# Patient Record
Sex: MALE | Race: WHITE | NOT HISPANIC OR LATINO | Employment: OTHER | URBAN - METROPOLITAN AREA
[De-identification: names, ages, dates, MRNs, and addresses within clinical notes are randomized per-mention and may not be internally consistent; named-entity substitution may affect disease eponyms.]

---

## 2019-10-28 ENCOUNTER — OFFICE VISIT - HEALTHEAST (OUTPATIENT)
Dept: FAMILY MEDICINE | Facility: CLINIC | Age: 69
End: 2019-10-28

## 2019-10-28 ENCOUNTER — COMMUNICATION - HEALTHEAST (OUTPATIENT)
Dept: FAMILY MEDICINE | Facility: CLINIC | Age: 69
End: 2019-10-28

## 2019-10-28 DIAGNOSIS — R39.11 URINARY HESITANCY: ICD-10-CM

## 2019-10-28 DIAGNOSIS — Z23 NEED FOR VACCINATION: ICD-10-CM

## 2019-10-28 DIAGNOSIS — Z11.59 NEED FOR HEPATITIS C SCREENING TEST: ICD-10-CM

## 2019-10-28 DIAGNOSIS — Z13.220 LIPID SCREENING: ICD-10-CM

## 2019-10-28 DIAGNOSIS — Z00.00 MEDICARE ANNUAL WELLNESS VISIT, SUBSEQUENT: ICD-10-CM

## 2019-10-28 DIAGNOSIS — Z13.1 SCREENING FOR DIABETES MELLITUS: ICD-10-CM

## 2019-10-28 DIAGNOSIS — H57.04: ICD-10-CM

## 2019-10-28 DIAGNOSIS — Z12.11 SCREEN FOR COLON CANCER: ICD-10-CM

## 2019-10-28 LAB
ANION GAP SERPL CALCULATED.3IONS-SCNC: 7 MMOL/L (ref 5–18)
BUN SERPL-MCNC: 15 MG/DL (ref 8–22)
CALCIUM SERPL-MCNC: 9.3 MG/DL (ref 8.5–10.5)
CHLORIDE BLD-SCNC: 104 MMOL/L (ref 98–107)
CHOLEST SERPL-MCNC: 219 MG/DL
CO2 SERPL-SCNC: 27 MMOL/L (ref 22–31)
CREAT SERPL-MCNC: 0.99 MG/DL (ref 0.7–1.3)
FASTING STATUS PATIENT QL REPORTED: YES
GFR SERPL CREATININE-BSD FRML MDRD: >60 ML/MIN/1.73M2
GLUCOSE BLD-MCNC: 96 MG/DL (ref 70–125)
HCV AB SERPL QL IA: NEGATIVE
HDLC SERPL-MCNC: 45 MG/DL
LDLC SERPL CALC-MCNC: 143 MG/DL
POTASSIUM BLD-SCNC: 3.8 MMOL/L (ref 3.5–5)
SODIUM SERPL-SCNC: 138 MMOL/L (ref 136–145)
TRIGL SERPL-MCNC: 153 MG/DL

## 2019-10-28 ASSESSMENT — MIFFLIN-ST. JEOR: SCORE: 1670.6

## 2021-06-02 NOTE — TELEPHONE ENCOUNTER
----- Message from Hudson Shields MD sent at 10/28/2019  3:15 PM CDT -----  No sign of hepatitis C infection, normal kidney function and blood sugar, lipids are similar to 4 years ago. But with your  Current age, blood pressure and these lipids, your 10-year risk of cardiovascular event such as heart attack or stroke is about 17%.  So current recommendation would be to have you start a statin medicine to lower your cholesterol and lower this risk.  An alternative would be to follow a low-fat low-cholesterol diet very carefully and recheck in 2 to 3 months although it will be hard for you to lower this enough to avoid the recommendation to consider medication.

## 2021-06-02 NOTE — TELEPHONE ENCOUNTER
Patient Returning Call  Reason for call:  Patient is returning call  Information relayed to patient:  Message below from Dr. Hudson Shields regarding lab results and risks, along with recommendations to begin a statin medication, or as an alternative may try to follow a strict low-fat low-cholesterol diet, with recheck in 2-3 months relayed to the patient.   Patient has additional questions:  No  If YES, what are your questions/concerns:  Patient stated he would like to take some time to think about his options and will call back with his decision.   Okay to leave a detailed message?: No

## 2021-06-02 NOTE — PROGRESS NOTES
Assessment and Plan:   Annual wellness visit.  1. Medicare annual wellness visit, subsequent     2. Need for vaccination  Influenza High Dose,Seasonal,PF 65+ Yrs    Pneumococcal conjugate vaccine 13-valent 6wks-17yrs; >50yrs   3. Screen for colon cancer  Ambulatory referral for Colonoscopy   4. Lipid screening  Lipid [place]   5. Screening for diabetes mellitus  Basic Metabolic Panel   6. Need for hepatitis C screening test  Hepatitis C Antibody (Anti-HCV)   7. Urinary hesitancy  Basic Metabolic Panel     Recommend he exercise daily for 1 hour moderately strenuous per national guidelines, recommend he watch portion sizes to try to slightly reduce weight.  Follow-up in 1 year for next annual wellness visit and for Pneumovax vaccination.  Call return earlier as needed.  He agrees with not doing PSA on screening basis.  He has no evidence of any increased cardiovascular risk on exam.    The patient's current medical problems were reviewed.    I have had an Advance Directives discussion with the patient.  The following health maintenance schedule was reviewed with the patient and provided in printed form in the after visit summary:   Health Maintenance   Topic Date Due     HEPATITIS C SCREENING  1950     ZOSTER VACCINES (1 of 2) 09/18/2000     COLONOSCOPY  08/19/2013     MEDICARE ANNUAL WELLNESS VISIT  09/14/2016     PNEUMOCOCCAL IMMUNIZATION 65+ LOW/MEDIUM RISK (1 of 2 - PCV13) 09/14/2016     INFLUENZA VACCINE RULE BASED (1) 08/01/2019     FALL RISK ASSESSMENT  10/28/2020     ADVANCE CARE PLANNING  10/28/2024     TD 18+ HE  09/14/2025        Subjective:   Chief Complaint: Mono Galaviz is an 69 y.o. male here for an Annual Wellness visit.   HPI: Generally feeling fine.  Was working 1 day for 7 to 8 hours with physical activity and had not drank much water when he felt some faster heart rate and his wristwatch did record a rate of up to 147 maximum but he was otherwise feeling okay.  No exertional chest pain  or dyspnea or dizziness or palpitations etc.    Review of Systems: History of a right eye injury that left him with pupillary dilatation on chronic basis.  He does see ophthalmologist for screening eye exam every year or 2.  Please see above.  The rest of the review of systems are negative for all systems.    Patient Care Team:  Avtar Calderón MD as PCP - General     There is no problem list on file for this patient.    Past Medical History:   Diagnosis Date     Kidney stone       Past Surgical History:   Procedure Laterality Date     [place] APPENDECTOMY      Description: Appendectomy;  Recorded: 04/25/2013;     TONSILLECTOMY AND ADENOIDECTOMY        Family History   Problem Relation Age of Onset     No Medical Problems Mother      Cancer Father         lung     Emphysema Maternal Grandfather      Cancer Maternal Grandmother         ovarian      Social History     Socioeconomic History     Marital status:      Spouse name: ang     Number of children: 4     Years of education: 16     Highest education level: Not on file   Occupational History     Occupation: , business     Employer:      Comment: retired   Social Needs     Financial resource strain: Not on file     Food insecurity:     Worry: Not on file     Inability: Not on file     Transportation needs:     Medical: Not on file     Non-medical: Not on file   Tobacco Use     Smoking status: Never Smoker     Smokeless tobacco: Never Used   Substance and Sexual Activity     Alcohol use: Yes     Alcohol/week: 1.7 - 2.5 standard drinks     Types: 2 - 3 drink(s) per week     Drug use: No     Sexual activity: Yes     Partners: Female   Lifestyle     Physical activity:     Days per week: Not on file     Minutes per session: Not on file     Stress: Not on file   Relationships     Social connections:     Talks on phone: Not on file     Gets together: Not on file     Attends Mu-ism service: Not on file     Active member of club or organization: Not  "on file     Attends meetings of clubs or organizations: Not on file     Relationship status: Not on file     Intimate partner violence:     Fear of current or ex partner: Not on file     Emotionally abused: Not on file     Physically abused: Not on file     Forced sexual activity: Not on file   Other Topics Concern     Not on file   Social History Narrative     Not on file      No current outpatient medications on file.     No current facility-administered medications for this visit.       Objective:   Vital Signs:   Visit Vitals  /68   Pulse (!) 59   Temp 97.5  F (36.4  C) (Oral)   Ht 5' 11.25\" (1.81 m)   Wt 195 lb (88.5 kg)   SpO2 96%   BMI 27.01 kg/m         VisionScreening:  No exam data present     PHYSICAL EXAM  Alert male.  Head normocephalic.  External ears and TMs normal.  Eyes show the right pupil is significantly dilated compared to the left consistent with his known past injury.  Nose and oropharynx negative.  Neck supple without adenopathy or thyromegaly.  Lungs clear.  Heart regular rate and rhythm without murmur.  Abdomen shows no masses tenderness or hepatosplenomegaly.  Genitalia normal normal testes and no hernia.  Mild smooth enlargement of the prostate with no focal nodules.  Extremities show no edema.  Good peripheral pulses.  No cervical axillary or groin adenopathy.  Skin shows a benign lesion such as seborrheic keratoses on the back and cherry angioma on the chest but no sign of any malignancy.  He is alert with clear speech.    Assessment Results 10/28/2019   Activities of Daily Living No help needed   Instrumental Activities of Daily Living No help needed   Mini Cog Total Score 5   Some recent data might be hidden     A Mini-Cog score of 0-2 suggests the possibility of dementia, score of 3-5 suggests no dementia    Identified Health Risks:     The patient was counseled and encouraged to consider modifying their diet and eating habits. He was provided with information on recommended " healthy diet options.  The patient was provided with written information regarding signs of hearing loss.  Patient's advanced directive was discussed and I am comfortable with the patient's wishes.

## 2021-06-03 VITALS
TEMPERATURE: 97.5 F | SYSTOLIC BLOOD PRESSURE: 108 MMHG | HEART RATE: 59 BPM | WEIGHT: 195 LBS | OXYGEN SATURATION: 96 % | DIASTOLIC BLOOD PRESSURE: 68 MMHG | BODY MASS INDEX: 27.3 KG/M2 | HEIGHT: 71 IN

## 2021-06-14 ENCOUNTER — OFFICE VISIT - HEALTHEAST (OUTPATIENT)
Dept: FAMILY MEDICINE | Facility: CLINIC | Age: 71
End: 2021-06-14

## 2021-06-14 DIAGNOSIS — H90.3 ASYMMETRICAL SENSORINEURAL HEARING LOSS: ICD-10-CM

## 2021-06-14 DIAGNOSIS — Z12.11 SCREENING FOR COLON CANCER: ICD-10-CM

## 2021-06-14 DIAGNOSIS — R73.01 ELEVATED FASTING GLUCOSE: ICD-10-CM

## 2021-06-14 DIAGNOSIS — Z00.00 ROUTINE GENERAL MEDICAL EXAMINATION AT A HEALTH CARE FACILITY: ICD-10-CM

## 2021-06-14 DIAGNOSIS — E78.00 HYPERCHOLESTEREMIA: ICD-10-CM

## 2021-06-14 LAB
ALT SERPL W P-5'-P-CCNC: 14 U/L (ref 0–45)
ANION GAP SERPL CALCULATED.3IONS-SCNC: 11 MMOL/L (ref 5–18)
BUN SERPL-MCNC: 19 MG/DL (ref 8–28)
CALCIUM SERPL-MCNC: 8.6 MG/DL (ref 8.5–10.5)
CHLORIDE BLD-SCNC: 108 MMOL/L (ref 98–107)
CHOLEST SERPL-MCNC: 196 MG/DL
CO2 SERPL-SCNC: 24 MMOL/L (ref 22–31)
CREAT SERPL-MCNC: 0.98 MG/DL (ref 0.7–1.3)
FASTING STATUS PATIENT QL REPORTED: YES
GFR SERPL CREATININE-BSD FRML MDRD: >60 ML/MIN/1.73M2
GLUCOSE BLD-MCNC: 99 MG/DL (ref 70–125)
HDLC SERPL-MCNC: 48 MG/DL
LDLC SERPL CALC-MCNC: 134 MG/DL
POTASSIUM BLD-SCNC: 4.4 MMOL/L (ref 3.5–5)
SODIUM SERPL-SCNC: 143 MMOL/L (ref 136–145)
TRIGL SERPL-MCNC: 71 MG/DL

## 2021-06-14 ASSESSMENT — MIFFLIN-ST. JEOR: SCORE: 1611.07

## 2021-06-14 NOTE — ASSESSMENT & PLAN NOTE
This patient presents for annual exam.  No specific concerns.  He has been working to reduce weight over the past year by reduction infried foods, saturated fats and red meat.  He does not particular miss the foods that he is been avoiding other than dairy.  He says he feels good overall, in particular as he references his age.  We reviewed in detail his family history and his health history.  In the past, has been screened for prostate cancer with a PSA.  He has no obstructive symptoms of the lower urinary tract.  We opted to not screen him with PSA testing.  He isdue for colonoscopy which was ordered as part of today's visit.  Fasting lab work will be completed.  He will continue to follow with his dermatologist annually.

## 2021-06-16 PROBLEM — E78.5 HYPERLIPIDEMIA LDL GOAL <100: Status: ACTIVE | Noted: 2019-10-28

## 2021-06-16 PROBLEM — H57.04: Status: ACTIVE | Noted: 2019-10-28

## 2021-06-16 PROBLEM — Z00.00 ROUTINE GENERAL MEDICAL EXAMINATION AT A HEALTH CARE FACILITY: Status: ACTIVE | Noted: 2021-06-14

## 2021-06-16 PROBLEM — H90.3 ASYMMETRICAL SENSORINEURAL HEARING LOSS: Status: ACTIVE | Noted: 2021-06-14

## 2021-06-17 NOTE — PATIENT INSTRUCTIONS - HE
Patient Instructions by Hudson Shields MD at 10/28/2019  8:20 AM     Author: Hudson Shields MD Service: -- Author Type: Physician    Filed: 10/28/2019  9:03 AM Encounter Date: 10/28/2019 Status: Signed    : Hudson Shields MD (Physician)         Patient Education   Understanding USDA MyPlate  The USDA (US Department of Agriculture) has guidelines to help you make healthy food choices. These are called MyPlate. MyPlate shows the food groups that make up healthy meals using the image of a place setting. Before you eat, think about the healthiest choices for what to put onto your plate or into your cup or bowl. To learn more about building a healthy plate, visit www.choosemyplate.gov.       The Food Groups    Fruits: Any fruit or 100% fruit juice counts as part of the Fruit Group. Fruits may be fresh, canned, frozen, or dried, and may be whole, cut-up, or pureed. Make half your plate fruits and vegetables.    Vegetables: Any vegetable or 100% vegetable juice counts as a member of the Vegetable Group. Vegetables may be fresh, frozen, canned, or dried. They can be served raw or cooked and may be whole, cut-up, or mashed. Make half your plate fruits and vegetables.     Grains: All foods made from grains are part of the Grains Group. These include wheat, rice, oats, cornmeal, and barley such as bread, pasta, oatmeal, cereal, tortillas, and grits. Grains should be no more than a quarter of your plate. At least half of your grains should be whole grains.    Protein: This group includes meat, poultry, seafood, beans and peas, eggs, processed soy products (like tofu), nuts (including nut butters), and seeds. Make protein choices no more than a quarter of your plate. Meat and poultry choices should be lean or low fat.    Dairy: All fluid milk products and foods made from milk that contain calcium, like yogurt and cheese are part of the Dairy Group. (Foods that have little calcium, such as cream, butter, and  cream cheese, are not part of the group.) Most dairy choices should be low-fat or fat-free.    Oils: These are fats that are liquid at room temperature. They include canola, corn, olive, soybean, and sunflower oil. Foods that are mainly oil include mayonnaise, certain salad dressings, and soft margarines. You should have only 5 to 7 teaspoons of oils a day. You probably already get this much from the food you eat.  Use [employer] to Help Build Your Meals  The [employer] can help you plan and track your meals and activity. You can look up individual foods to see or compare their nutritional value. You can get guidelines for what and how much you should eat. You can compare your food choices. And you can assess personal physical activities and see ways you can improve. Go to www.[employer].gov/[employer] Micro[employer]ures[employer]/.    8347-6904 [employer]. [address] [place] [address]. All rights reserved. This information is not intended as a substitute for professional medical care. Always follow your healthcare professional's instructions.           Patient Education   Signs of Hearing Loss  Hearing loss is a problem shared by many people. In fact, it is one of the most common health conditions, particularly as people age. Most people over age 65 have some hearing loss, and by age 80, almost everyone does. Because hearing loss usually occurs slowly over the years, you may not realize your hearing ability has gotten worse.       Have your hearing checked  Contact your [facility] care provider if you:    Have to strain to hear normal conversation.    Have to watch other peoples faces very carefully to follow what theyre saying.    Need to ask people to repeat what theyve said.    Often misunderstand what people are saying.    Turn the volume of the television or radio up so high that others complain.    Feel that people are mumbling when theyre talking to you.    Find that the effort to hear leaves you feeling  tired and irritated.    Notice, when using the phone, that you hear better with 1 ear than the other.    1628-4347 [employer]. [address] [place] [address], [place] [address]. All rights reserved. This information is not intended as a substitute for professional medical care. Always follow your healthcare professional's instructions.           Advance Directive  Patients advance directive was discussed and I am comfortable with the patients wishes.  Patient Education   Personalized Prevention Plan  You are due for the preventive services outlined below.  Your care team is available to assist you in scheduling these services.  If you have already completed any of these items, please share that information with your care team to update in your medical record.  Health Maintenance   Topic Date Due   ? HEPATITIS C SCREENING  1950   ? ZOSTER VACCINES (1 of 2) 09/18/2000   ? COLONOSCOPY  08/19/2013   ? MEDICARE ANNUAL WELLNESS VISIT  09/14/2016   ? PNEUMOCOCCAL IMMUNIZATION 65+ LOW/MEDIUM RISK (1 of 2 - PCV13) 09/14/2016   ? INFLUENZA VACCINE RULE BASED (1) 08/01/2019   ? FALL RISK ASSESSMENT  10/28/2020   ? ADVANCE CARE PLANNING  10/28/2024   ? TD 18+ HE  09/14/2025

## 2021-06-26 NOTE — PROGRESS NOTES
Assessment and Plan:     Patient has been advised of split billing requirements and indicates understanding: Yes    Asymmetrical sensorineural hearing loss  Follows with Dr. Mariee at [facility].  Left sided primarily.     Routine general medical examination at a health care facility  This patient presents for annual exam.  No specific concerns.  He has been working to reduce weight over the past year by reduction in fried foods, saturated fats and red meat.  He does not particular miss the foods that he is been avoiding other than dairy.  He says he feels good overall, in particular as he references his age.  We reviewed in detail his family history and his health history.  In the past, has been screened for prostate cancer with a PSA.  He has no obstructive symptoms of the lower urinary tract.  We opted to not screen him with PSA testing.  He is due for colonoscopy which was ordered as part of today's visit.  Fasting lab work will be completed.  He will continue to follow with his dermatologist annually.    The patient's current medical problems were reviewed.    I have had an Advance Directives discussion with the patient.  The following health maintenance schedule was reviewed with the patient and provided in printed form in the after visit summary:   Health Maintenance   Topic Date Due     COLORECTAL CANCER SCREENING  08/19/2018     Pneumococcal Vaccine: 65+ Years (2 of 2 - PPSV23) 10/28/2020     ZOSTER VACCINES (1 of 2) 06/28/2021 (Originally 9/18/2000)     INFLUENZA VACCINE RULE BASED (Season Ended) 08/01/2021     MEDICARE ANNUAL WELLNESS VISIT  06/14/2022     FALL RISK ASSESSMENT  06/14/2022     LIPID  10/28/2024     TD 18+ HE  09/14/2025     ADVANCE CARE PLANNING  06/14/2026     HEPATITIS C SCREENING  Completed     COVID-19 Vaccine  Completed     Pneumococcal Vaccine: Pediatrics (0 to 5 Years) and At-Risk Patients (6 to 64 Years)  Aged Out     HEPATITIS B VACCINES  Aged Out       Subjective:   Chief  "Complaint: Mono Galaviz is an 70 y.o. male here for an Annual Wellness visit.     HPI:  He has reduced consumption of saturated fat over the past couple of years.  \"Almost all the dairy.\"  He wonders about his lipid labs. He has lost ~14 lbs.      Review of Systems:    Please see above.  The rest of the review of systems are negative for all systems.    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     Hyperlipidemia LDL goal <100     Routine general medical examination at a health care facility     Asymmetrical sensorineural hearing loss     Past Medical History:   Diagnosis Date     Kidney stone       Past Surgical History:   Procedure Laterality Date     [place] APPENDECTOMY      Description: Appendectomy;  Recorded: 04/25/2013;     TONSILLECTOMY AND ADENOIDECTOMY        Family History   Problem Relation Age of Onset     Dementia Mother 92     Cancer Father         lung     Emphysema Maternal Grandfather      Cancer Maternal Grandmother         ovarian     No Medical Problems Daughter      No Medical Problems Son      No Medical Problems Brother      No Medical Problems Daughter      Parkinsonism Daughter         \"Parkison's like.\"      Breast cancer Neg Hx      Colon cancer Neg Hx      Prostate cancer Neg Hx      Diabetes Neg Hx      Heart disease Neg Hx       Social History     Socioeconomic History     Marital status:      Spouse name: ang     Number of children: 4     Years of education: 16     Highest education level: Not on file   Occupational History     Occupation: , business     Employer: [employer]     Comment: retired   Social Needs     Financial resource strain: Not on file     Food insecurity     Worry: Not on file     Inability: Not on file     Transportation needs     " "Medical: Not on file     Non-medical: Not on file   Tobacco Use     Smoking status: Never Smoker     Smokeless tobacco: Never Used   Substance and Sexual Activity     Alcohol use: Yes     Alcohol/week: 6.0 - 7.0 standard drinks     Types: 2 - 3 Standard drinks or equivalent, 4 Cans of beer per week     Drug use: No     Sexual activity: Yes     Partners: Female   Lifestyle     Physical activity     Days per week: Not on file     Minutes per session: Not on file     Stress: Not on file   Relationships     Social connections     Talks on phone: Not on file     Gets together: Not on file     Attends Confucianist service: Not on file     Active member of club or organization: Not on file     Attends meetings of clubs or organizations: Not on file     Relationship status: Not on file     Intimate partner violence     Fear of current or ex partner: Not on file     Emotionally abused: Not on file     Physically abused: Not on file     Forced sexual activity: Not on file   Other Topics Concern     Not on file   Social History Narrative     Not on file      No current outpatient medications on file.     No current facility-administered medications for this visit.       Objective:   Vital Signs:   Visit Vitals  /62 (Patient Site: Left Arm, Patient Position: Sitting, Cuff Size: Adult Regular)   Pulse (!) 54   Temp 97.6  F (36.4  C) (Oral)   Resp 16   Ht 5' 11.5\" (1.816 m)   Wt 181 lb (82.1 kg)   SpO2 98% Comment: room air   BMI 24.89 kg/m       VisionScreening:  No exam data present     PHYSICAL EXAM  Physical Exam  Vitals signs reviewed.   Constitutional:       General: He is not in acute distress.     Appearance: He is well-developed.   HENT:      Head: Normocephalic and atraumatic.      Right Ear: External ear normal.      Left Ear: External ear normal.      Nose: Nose normal.      Mouth/Throat:      Pharynx: No oropharyngeal exudate.   Eyes:      General: No scleral icterus.        Right eye: No discharge.         Left " eye: No discharge.      Conjunctiva/sclera: Conjunctivae normal.      Pupils: Pupils are equal, round, and reactive to light.   Neck:      Musculoskeletal: Normal range of motion.      Thyroid: No thyromegaly.   Cardiovascular:      Rate and Rhythm: Normal rate and regular rhythm.      Heart sounds: Normal heart sounds. No murmur. No friction rub. No gallop.    Pulmonary:      Effort: Pulmonary effort is normal. No respiratory distress.      Breath sounds: Normal breath sounds. No wheezing.   Abdominal:      General: There is no distension.      Palpations: Abdomen is soft. There is no mass.      Tenderness: There is no abdominal tenderness.   Musculoskeletal: Normal range of motion.   Lymphadenopathy:      Cervical: No cervical adenopathy.   Skin:     General: Skin is warm.   Neurological:      Mental Status: He is alert and oriented to person, place, and time.      Cranial Nerves: No cranial nerve deficit.      Motor: No abnormal muscle tone.      Deep Tendon Reflexes: Reflexes are normal and symmetric.   Psychiatric:         Behavior: Behavior normal.         Thought Content: Thought content normal.         Judgment: Judgment normal.           Assessment Results 6/14/2021   Activities of Daily Living No help needed   Instrumental Activities of Daily Living No help needed   Mini Cog Total Score 5   Some recent data might be hidden     A Mini-Cog score of 0-2 suggests the possibility of dementia, score of 3-5 suggests no dementia    Identified Health Risks:     The patient was counseled and encouraged to consider modifying their diet and eating habits. He was provided with information on recommended healthy diet options.  Patient's advanced directive was discussed and I am comfortable with the patient's wishes.

## 2021-06-26 NOTE — PATIENT INSTRUCTIONS - HE
Interesting articles on nutrition:    Saturated fat: Saturated Fats and Health: A Reassessment and Proposal for Food-Based Recommendations: [facility] State-of-the-Art Review June (https://www.jacc.org/doi/full/10.1016/j.jacc.2020.05.077)    A Pesco-Mediterranean Diet With Intermittent Fasting: [facility] Review Topic of the Week (https://www.jacc.org/doi/full/10.1016/j.jacc.2020.07.049)        Patient Education         Advance Directive  Patient s advance directive was discussed and I am comfortable with the patient s wishes.  Patient Education   Personalized Prevention Plan  You are due for the preventive services outlined below.  Your care team is available to assist you in scheduling these services.  If you have already completed any of these items, please share that information with your care team to update in your medical record.  Health Maintenance Due   Topic Date Due     COLORECTAL CANCER SCREENING  08/19/2018     Pneumococcal Vaccine: 65+ Years (2 of 2 - PPSV23) 10/28/2020

## 2021-07-06 VITALS
HEIGHT: 72 IN | HEART RATE: 54 BPM | DIASTOLIC BLOOD PRESSURE: 62 MMHG | TEMPERATURE: 97.6 F | BODY MASS INDEX: 24.52 KG/M2 | OXYGEN SATURATION: 98 % | RESPIRATION RATE: 16 BRPM | SYSTOLIC BLOOD PRESSURE: 116 MMHG | WEIGHT: 181 LBS

## 2021-10-11 ENCOUNTER — TRANSFERRED RECORDS (OUTPATIENT)
Dept: HEALTH INFORMATION MANAGEMENT | Facility: CLINIC | Age: 71
End: 2021-10-11

## 2021-10-16 ENCOUNTER — HEALTH MAINTENANCE LETTER (OUTPATIENT)
Age: 71
End: 2021-10-16

## 2021-10-25 ENCOUNTER — APPOINTMENT (OUTPATIENT)
Dept: ULTRASOUND IMAGING | Facility: HOSPITAL | Age: 71
DRG: 699 | End: 2021-10-25
Attending: INTERNAL MEDICINE
Payer: MEDICARE

## 2021-10-25 ENCOUNTER — HOSPITAL ENCOUNTER (INPATIENT)
Facility: HOSPITAL | Age: 71
LOS: 2 days | Discharge: HOME | DRG: 699 | End: 2021-10-28
Attending: STUDENT IN AN ORGANIZED HEALTH CARE EDUCATION/TRAINING PROGRAM | Admitting: INTERNAL MEDICINE
Payer: MEDICARE

## 2021-10-25 DIAGNOSIS — R03.0 ELEVATED BLOOD PRESSURE READING WITHOUT DIAGNOSIS OF HYPERTENSION: Primary | ICD-10-CM

## 2021-10-25 DIAGNOSIS — R33.9 URINARY RETENTION: ICD-10-CM

## 2021-10-25 PROBLEM — R63.4 WEIGHT LOSS, UNINTENTIONAL: Status: ACTIVE | Noted: 2021-10-25

## 2021-10-25 PROBLEM — R31.9 HEMATURIA: Status: ACTIVE | Noted: 2021-10-25

## 2021-10-25 PROBLEM — N17.9 ARF (ACUTE RENAL FAILURE) (H): Status: ACTIVE | Noted: 2021-10-25

## 2021-10-25 PROBLEM — E87.5 HYPERKALEMIA: Status: ACTIVE | Noted: 2021-10-25

## 2021-10-25 LAB
ALBUMIN UR-MCNC: NEGATIVE MG/DL
ANION GAP SERPL CALCULATED.3IONS-SCNC: 11 MMOL/L (ref 5–18)
ANION GAP SERPL CALCULATED.3IONS-SCNC: 7 MMOL/L (ref 5–18)
APPEARANCE UR: CLEAR
BASOPHILS # BLD MANUAL: 0 10E3/UL (ref 0–0.2)
BASOPHILS NFR BLD MANUAL: 0 %
BILIRUB UR QL STRIP: NEGATIVE
BUN SERPL-MCNC: 28 MG/DL (ref 8–28)
BUN SERPL-MCNC: 30 MG/DL (ref 8–28)
CALCIUM SERPL-MCNC: 8.5 MG/DL (ref 8.5–10.5)
CALCIUM SERPL-MCNC: 9.9 MG/DL (ref 8.5–10.5)
CHLORIDE BLD-SCNC: 110 MMOL/L (ref 98–107)
CHLORIDE BLD-SCNC: 111 MMOL/L (ref 98–107)
CHOLEST SERPL-MCNC: 164 MG/DL
CO2 SERPL-SCNC: 22 MMOL/L (ref 22–31)
CO2 SERPL-SCNC: 27 MMOL/L (ref 22–31)
COLOR UR AUTO: COLORLESS
CREAT SERPL-MCNC: 2.72 MG/DL (ref 0.7–1.3)
CREAT SERPL-MCNC: 2.85 MG/DL (ref 0.7–1.3)
EOSINOPHIL # BLD MANUAL: 0.1 10E3/UL (ref 0–0.7)
EOSINOPHIL NFR BLD MANUAL: 1 %
ERYTHROCYTE [DISTWIDTH] IN BLOOD BY AUTOMATED COUNT: 13 % (ref 10–15)
ERYTHROCYTE [DISTWIDTH] IN BLOOD BY AUTOMATED COUNT: 13 % (ref 10–15)
GFR SERPL CREATININE-BSD FRML MDRD: 21 ML/MIN/1.73M2
GFR SERPL CREATININE-BSD FRML MDRD: 22 ML/MIN/1.73M2
GLUCOSE BLD-MCNC: 85 MG/DL (ref 70–125)
GLUCOSE BLD-MCNC: 95 MG/DL (ref 70–125)
GLUCOSE UR STRIP-MCNC: NEGATIVE MG/DL
HCT VFR BLD AUTO: 38 % (ref 40–53)
HCT VFR BLD AUTO: 38 % (ref 40–53)
HDLC SERPL-MCNC: 42 MG/DL
HGB BLD-MCNC: 11.9 G/DL (ref 13.3–17.7)
HGB BLD-MCNC: 11.9 G/DL (ref 13.3–17.7)
HGB UR QL STRIP: ABNORMAL
KETONES UR STRIP-MCNC: NEGATIVE MG/DL
LDLC SERPL CALC-MCNC: 100 MG/DL
LEUKOCYTE ESTERASE UR QL STRIP: ABNORMAL
LYMPHOCYTES # BLD MANUAL: 0.9 10E3/UL (ref 0.8–5.3)
LYMPHOCYTES NFR BLD MANUAL: 17 %
MCH RBC QN AUTO: 31 PG (ref 26.5–33)
MCH RBC QN AUTO: 31 PG (ref 26.5–33)
MCHC RBC AUTO-ENTMCNC: 31.3 G/DL (ref 31.5–36.5)
MCHC RBC AUTO-ENTMCNC: 31.3 G/DL (ref 31.5–36.5)
MCV RBC AUTO: 99 FL (ref 78–100)
MCV RBC AUTO: 99 FL (ref 78–100)
MONOCYTES # BLD MANUAL: 0.2 10E3/UL (ref 0–1.3)
MONOCYTES NFR BLD MANUAL: 3 %
NEUTROPHILS # BLD MANUAL: 4.3 10E3/UL (ref 1.6–8.3)
NEUTROPHILS NFR BLD MANUAL: 79 %
NITRATE UR QL: NEGATIVE
PATH REV: NORMAL
PH UR STRIP: 6.5 [PH] (ref 5–7)
PLAT MORPH BLD: NORMAL
PLATELET # BLD AUTO: 149 10E3/UL (ref 150–450)
PLATELET # BLD AUTO: 149 10E3/UL (ref 150–450)
POTASSIUM BLD-SCNC: 5 MMOL/L (ref 3.5–5)
POTASSIUM BLD-SCNC: 5.2 MMOL/L (ref 3.5–5)
RBC # BLD AUTO: 3.84 10E6/UL (ref 4.4–5.9)
RBC # BLD AUTO: 3.84 10E6/UL (ref 4.4–5.9)
RBC MORPH BLD: NORMAL
RBC URINE: 9 /HPF
SARS-COV-2 RNA RESP QL NAA+PROBE: NEGATIVE
SODIUM SERPL-SCNC: 144 MMOL/L (ref 136–145)
SODIUM SERPL-SCNC: 144 MMOL/L (ref 136–145)
SP GR UR STRIP: 1.01 (ref 1–1.03)
TRIGL SERPL-MCNC: 109 MG/DL
TSH SERPL DL<=0.005 MIU/L-ACNC: 1.34 UIU/ML (ref 0.3–5)
UROBILINOGEN UR STRIP-MCNC: <2 MG/DL
WBC # BLD AUTO: 5.4 10E3/UL (ref 4–11)
WBC # BLD AUTO: 5.4 10E3/UL (ref 4–11)
WBC URINE: 16 /HPF

## 2021-10-25 PROCEDURE — G0378 HOSPITAL OBSERVATION PER HR: HCPCS

## 2021-10-25 PROCEDURE — 84443 ASSAY THYROID STIM HORMONE: CPT | Performed by: INTERNAL MEDICINE

## 2021-10-25 PROCEDURE — 258N000003 HC RX IP 258 OP 636: Performed by: INTERNAL MEDICINE

## 2021-10-25 PROCEDURE — 96365 THER/PROPH/DIAG IV INF INIT: CPT

## 2021-10-25 PROCEDURE — 80048 BASIC METABOLIC PNL TOTAL CA: CPT | Performed by: STUDENT IN AN ORGANIZED HEALTH CARE EDUCATION/TRAINING PROGRAM

## 2021-10-25 PROCEDURE — 87086 URINE CULTURE/COLONY COUNT: CPT | Performed by: STUDENT IN AN ORGANIZED HEALTH CARE EDUCATION/TRAINING PROGRAM

## 2021-10-25 PROCEDURE — 76770 US EXAM ABDO BACK WALL COMP: CPT

## 2021-10-25 PROCEDURE — 250N000013 HC RX MED GY IP 250 OP 250 PS 637: Performed by: INTERNAL MEDICINE

## 2021-10-25 PROCEDURE — 80061 LIPID PANEL: CPT | Performed by: INTERNAL MEDICINE

## 2021-10-25 PROCEDURE — 96361 HYDRATE IV INFUSION ADD-ON: CPT

## 2021-10-25 PROCEDURE — 250N000013 HC RX MED GY IP 250 OP 250 PS 637: Performed by: STUDENT IN AN ORGANIZED HEALTH CARE EDUCATION/TRAINING PROGRAM

## 2021-10-25 PROCEDURE — 93005 ELECTROCARDIOGRAM TRACING: CPT | Performed by: INTERNAL MEDICINE

## 2021-10-25 PROCEDURE — 51702 INSERT TEMP BLADDER CATH: CPT

## 2021-10-25 PROCEDURE — C9803 HOPD COVID-19 SPEC COLLECT: HCPCS

## 2021-10-25 PROCEDURE — 85027 COMPLETE CBC AUTOMATED: CPT | Performed by: STUDENT IN AN ORGANIZED HEALTH CARE EDUCATION/TRAINING PROGRAM

## 2021-10-25 PROCEDURE — 93970 EXTREMITY STUDY: CPT

## 2021-10-25 PROCEDURE — 80048 BASIC METABOLIC PNL TOTAL CA: CPT | Performed by: INTERNAL MEDICINE

## 2021-10-25 PROCEDURE — 96366 THER/PROPH/DIAG IV INF ADDON: CPT

## 2021-10-25 PROCEDURE — 99285 EMERGENCY DEPT VISIT HI MDM: CPT | Mod: 25

## 2021-10-25 PROCEDURE — 84153 ASSAY OF PSA TOTAL: CPT | Performed by: STUDENT IN AN ORGANIZED HEALTH CARE EDUCATION/TRAINING PROGRAM

## 2021-10-25 PROCEDURE — 99220 PR INITIAL OBSERVATION CARE,LEVEL III: CPT | Mod: GC | Performed by: INTERNAL MEDICINE

## 2021-10-25 PROCEDURE — 36415 COLL VENOUS BLD VENIPUNCTURE: CPT | Performed by: STUDENT IN AN ORGANIZED HEALTH CARE EDUCATION/TRAINING PROGRAM

## 2021-10-25 PROCEDURE — 81001 URINALYSIS AUTO W/SCOPE: CPT | Performed by: STUDENT IN AN ORGANIZED HEALTH CARE EDUCATION/TRAINING PROGRAM

## 2021-10-25 PROCEDURE — 51798 US URINE CAPACITY MEASURE: CPT

## 2021-10-25 PROCEDURE — 258N000003 HC RX IP 258 OP 636: Performed by: STUDENT IN AN ORGANIZED HEALTH CARE EDUCATION/TRAINING PROGRAM

## 2021-10-25 PROCEDURE — 250N000011 HC RX IP 250 OP 636: Performed by: INTERNAL MEDICINE

## 2021-10-25 PROCEDURE — 87635 SARS-COV-2 COVID-19 AMP PRB: CPT | Performed by: STUDENT IN AN ORGANIZED HEALTH CARE EDUCATION/TRAINING PROGRAM

## 2021-10-25 PROCEDURE — 36415 COLL VENOUS BLD VENIPUNCTURE: CPT | Performed by: INTERNAL MEDICINE

## 2021-10-25 RX ORDER — ONDANSETRON 2 MG/ML
4 INJECTION INTRAMUSCULAR; INTRAVENOUS EVERY 6 HOURS PRN
Status: DISCONTINUED | OUTPATIENT
Start: 2021-10-25 | End: 2021-10-28 | Stop reason: HOSPADM

## 2021-10-25 RX ORDER — CEFTRIAXONE 1 G/1
1 INJECTION, POWDER, FOR SOLUTION INTRAMUSCULAR; INTRAVENOUS EVERY 24 HOURS
Status: DISCONTINUED | OUTPATIENT
Start: 2021-10-25 | End: 2021-10-28

## 2021-10-25 RX ORDER — ONDANSETRON 4 MG/1
4 TABLET, ORALLY DISINTEGRATING ORAL EVERY 6 HOURS PRN
Status: DISCONTINUED | OUTPATIENT
Start: 2021-10-25 | End: 2021-10-28 | Stop reason: HOSPADM

## 2021-10-25 RX ORDER — PROCHLORPERAZINE MALEATE 5 MG
5 TABLET ORAL EVERY 6 HOURS PRN
Status: DISCONTINUED | OUTPATIENT
Start: 2021-10-25 | End: 2021-10-28 | Stop reason: HOSPADM

## 2021-10-25 RX ORDER — SODIUM CHLORIDE 9 MG/ML
INJECTION, SOLUTION INTRAVENOUS CONTINUOUS
Status: DISCONTINUED | OUTPATIENT
Start: 2021-10-25 | End: 2021-10-25

## 2021-10-25 RX ORDER — PROCHLORPERAZINE 25 MG
12.5 SUPPOSITORY, RECTAL RECTAL EVERY 12 HOURS PRN
Status: DISCONTINUED | OUTPATIENT
Start: 2021-10-25 | End: 2021-10-28 | Stop reason: HOSPADM

## 2021-10-25 RX ORDER — HYDRALAZINE HYDROCHLORIDE 25 MG/1
25 TABLET, FILM COATED ORAL ONCE
Status: COMPLETED | OUTPATIENT
Start: 2021-10-25 | End: 2021-10-25

## 2021-10-25 RX ORDER — LOSARTAN POTASSIUM 25 MG/1
25 TABLET ORAL DAILY
Status: DISCONTINUED | OUTPATIENT
Start: 2021-10-25 | End: 2021-10-26

## 2021-10-25 RX ORDER — HYDRALAZINE HYDROCHLORIDE 20 MG/ML
10 INJECTION INTRAMUSCULAR; INTRAVENOUS EVERY 4 HOURS PRN
Status: DISCONTINUED | OUTPATIENT
Start: 2021-10-25 | End: 2021-10-28 | Stop reason: HOSPADM

## 2021-10-25 RX ORDER — SODIUM CHLORIDE 9 MG/ML
INJECTION, SOLUTION INTRAVENOUS CONTINUOUS
Status: DISCONTINUED | OUTPATIENT
Start: 2021-10-26 | End: 2021-10-28

## 2021-10-25 RX ADMIN — HYDRALAZINE HYDROCHLORIDE 25 MG: 25 TABLET, FILM COATED ORAL at 19:32

## 2021-10-25 RX ADMIN — SODIUM CHLORIDE: 9 INJECTION, SOLUTION INTRAVENOUS at 20:53

## 2021-10-25 RX ADMIN — LOSARTAN POTASSIUM 25 MG: 25 TABLET, FILM COATED ORAL at 20:16

## 2021-10-25 RX ADMIN — CEFTRIAXONE SODIUM 1 G: 1 INJECTION, POWDER, FOR SOLUTION INTRAMUSCULAR; INTRAVENOUS at 22:09

## 2021-10-25 RX ADMIN — SODIUM CHLORIDE 1000 ML: 9 INJECTION, SOLUTION INTRAVENOUS at 19:03

## 2021-10-25 ASSESSMENT — ENCOUNTER SYMPTOMS
FREQUENCY: 1
ABDOMINAL DISTENTION: 1
WEAKNESS: 0
NUMBNESS: 0
BACK PAIN: 0
FEVER: 0

## 2021-10-25 ASSESSMENT — ACTIVITIES OF DAILY LIVING (ADL): DEPENDENT_IADLS:: INDEPENDENT

## 2021-10-25 NOTE — ED PROVIDER NOTES
"Expected Patient Referral to ED  3:28 PM    Referring Clinic/Provider:  [facility]    Reason for referral/Clinical facts:  Seen in [place], sent to [facility] for abdominal mass, then had outpatient CT study today. \"Abdominal mass\" was his distended bladder seen on CT.  Pt with distended bladder/retention with b/l hydro.  Pt needs a navarro catheter.  [facility] trying to call his primary clinic or anyone associated with him, but unable to.  As a result, she's having him sent to us for navarro catheter.      Recommendations provided:  Navarro for urinary retention, labs    Caller was informed that this institution does  possess the capabilities and/or resources to provide for patient and should be transferred to our institution.    Based on the information provided, discussed that this patient likely is nota good candidate for direct admission to this institution and that provider could proceed as such.  If however direct admit is sought and any hurdles encountered, this ED would be happy to see the patient and evaluate.    Informed caller that recommendations provided are recommendations based only on the facts provided and that they responsible to accept or reject the advice, or to seek a formal in person consultation as needed and that this ED will see/treat patient should they arrive.      Isacc Cornell, DO  Emergency Medicine  [facility] EMERGENCY DEPARTMENT  [facility] [facility] [id number] [phone]      Isacc Cornell MD  10/25/21 1533    "

## 2021-10-25 NOTE — ED PROVIDER NOTES
EMERGENCY DEPARTMENT ENCOUNTER      NAME: Mono Galaviz  AGE: 71 year old male  YOB: 1950  MRN: [id number]  EVALUATION DATE & TIME: 10/25/2021  5:32 PM    PCP: Dru Lopez    ED PROVIDER: Isatu Byers MD    Chief Complaint   Patient presents with     Urinary Retention     FINAL IMPRESSION:  1. Urinary retention      ED COURSE & MEDICAL DECISION MAKIN year old old male who presents to the ED for evaluation of urinary retention.   History and physical examination as documented. Vital signs reassuring.   Had an abdominal mass felt on exam, went for CT imaging which showed a distended bladder with urinary retention. He has had some diminished urination over the past few weeks as all as frequency. This is a new problem for him.   He has no low back pain. No focal weakness. No saddle anesthesia. Presentation is not c/w cauda equina.  Bladder scan confirms >999 mL. Creatinine elevated to 2.85 consistent with post-renal syndrome. We placed a navarro catheter. UA pending. I ordered 1L NS. I think he should be observed due to his JACQUES. We will place him in observation for IV hydration and BMP recheck.   He is ok with this plan. Discussed with hospitalist who accepted him for admission.      Scribe time stamps  5:46 PM Met with patient for initial interview and exam. Plan for care in the ED was discussed. I saw the patient while wearing a surgical mask and gloves.   0 minutes of critical care time     MEDICATIONS GIVEN IN THE EMERGENCY:  Medications   0.9% sodium chloride BOLUS (has no administration in time range)       NEW PRESCRIPTIONS STARTED AT TODAY'S ER VISIT  New Prescriptions    No medications on file          =================================================================    HPI    Patient information was obtained from: Patient    Use of : N/A     Mono Galaviz is a 71 year old male with a pertinent history of kidney stone and hyperlipidemia who presents to this ED via walk  "in for evaluation of urinary retention.     Patient presents with complaints of upper abdominal distention. He had a CT earlier today which found that his bladder was full. Patient was also found to have a resulting effect on his kidney function. He was sent here to have the bladder drained. Patient denies any bladder pain, but does not some bladder discomfort. He reports decreased urine volume and increased urinary frequency. Earlier, patient also noticed some left leg swelling.     He denies any fever, saddle anesthesia, back pain, or weakness in his legs. No regular ibuprofen use.       REVIEW OF SYSTEMS   Review of Systems   Constitutional: Negative for fever.   Cardiovascular: Positive for leg swelling (left leg).   Gastrointestinal: Positive for abdominal distention.   Genitourinary: Positive for decreased urine volume and frequency.        Endorses bladder discomfort, denies bladder pain   Musculoskeletal: Negative for back pain.   Neurological: Negative for weakness and numbness.   All other systems reviewed and are negative.       PAST MEDICAL HISTORY:  Past Medical History:   Diagnosis Date     Kidney stone        PAST SURGICAL HISTORY:  Past Surgical History:   Procedure Laterality Date     C APPENDECTOMY      Description: Appendectomy;  Recorded: 04/25/2013;     TONSILLECTOMY & ADENOIDECTOMY         ALLERGIES:  No Known Allergies    FAMILY HISTORY:  Family History   Problem Relation Age of Onset     Dementia Mother 92.00     Cancer Father         lung     Emphysema Maternal Grandfather      Cancer Maternal Grandmother         ovarian     No Known Problems Daughter      No Known Problems Son      No Known Problems Brother      No Known Problems Daughter      Parkinsonism Daughter         \"Parkison's like.\"      Breast Cancer No family hx of      Colon Cancer No family hx of      Prostate Cancer No family hx of      Diabetes No family hx of      Heart Disease No family hx of        SOCIAL HISTORY:   Social " History     Socioeconomic History     Marital status:      Spouse name: Not on file     Number of children: 4     Years of education: 16     Highest education level: Not on file   Occupational History     Not on file   Tobacco Use     Smoking status: Never Smoker     Smokeless tobacco: Never Used   Substance and Sexual Activity     Alcohol use: Yes     Alcohol/week: 6.0 - 7.0 standard drinks     Drug use: No     Sexual activity: Yes     Partners: Female   Other Topics Concern     Not on file   Social History Narrative     Not on file     Social Determinants of Health     Financial Resource Strain:      Difficulty of Paying Living Expenses:    Food Insecurity:      Worried About Running Out of Food in the Last Year:      Ran Out of Food in the Last Year:    Transportation Needs:      Lack of Transportation (Medical):      Lack of Transportation (Non-Medical):    Physical Activity:      Days of Exercise per Week:      Minutes of Exercise per Session:    Stress:      Feeling of Stress :    Social Connections:      Frequency of Communication with Friends and Family:      Frequency of Social Gatherings with Friends and Family:      Attends Roman Catholic Services:      Active Member of Clubs or Organizations:      Attends Club or Organization Meetings:      Marital Status:    Intimate Partner Violence:      Fear of Current or Ex-Partner:      Emotionally Abused:      Physically Abused:      Sexually Abused:        VITALS:  BP (!) 227/91   Pulse 61   Temp 97.7  F (36.5  C) (Temporal)   Resp 16   Wt 78.9 kg (174 lb)   SpO2 100%   BMI 23.93 kg/m      PHYSICAL EXAM    General: NAD.  HEENT: No external signs of trauma. No scleral icterus. Oropharynx clear and moist.  Chest/Pulm: No tenderness to palpation. Lungs clear to auscultation bilaterally.  CV: Regular rate and rhythm without murmurs.  Abdomen: Soft. Distension in the suprapubic region. Non-tender.  MSK/Extremities: Actively moving all four extremities. No  pedal edema.  Skin: No visible rashes  Neuro: Alert and conversant.   Psych: Behavior normal.    LAB:  All pertinent labs reviewed and interpreted.  Results for orders placed or performed during the hospital encounter of 10/25/21   CBC (+ platelets, no diff)   Result Value Ref Range    WBC Count 5.4 4.0 - 11.0 10e3/uL    RBC Count 3.84 (L) 4.40 - 5.90 10e6/uL    Hemoglobin 11.9 (L) 13.3 - 17.7 g/dL    Hematocrit 38.0 (L) 40.0 - 53.0 %    MCV 99 78 - 100 fL    MCH 31.0 26.5 - 33.0 pg    MCHC 31.3 (L) 31.5 - 36.5 g/dL    RDW 13.0 10.0 - 15.0 %    Platelet Count 149 (L) 150 - 450 10e3/uL   Basic metabolic panel   Result Value Ref Range    Sodium 144 136 - 145 mmol/L    Potassium 5.2 (H) 3.5 - 5.0 mmol/L    Chloride 110 (H) 98 - 107 mmol/L    Carbon Dioxide (CO2) 27 22 - 31 mmol/L    Anion Gap 7 5 - 18 mmol/L    Urea Nitrogen 30 (H) 8 - 28 mg/dL    Creatinine 2.85 (H) 0.70 - 1.30 mg/dL    Calcium 9.9 8.5 - 10.5 mg/dL    Glucose 95 70 - 125 mg/dL    GFR Estimate 21 (L) >60 mL/min/1.73m2       I, Beulah Hopkins, am serving as a scribe to document services personally performed by Dr. Isatu Byers based on my observation and the provider's statements to me. I, Isatu Byers MD attest that Beulah Hopkins is acting in a scribe capacity, has observed my performance of the services and has documented them in accordance with my direction.    Isatu Byers M.D.  Emergency Medicine  [facility]       Isatu Byers MD  10/25/21 [id number]

## 2021-10-25 NOTE — ED TRIAGE NOTES
"Pt had a CT earlier today and they found that his bladder was distended. He was sent here for a navarro catheter. Pt denies any pain at this time, but does endorse abdominal tenderness. Pt is also hypertensive in triage at 227/91, no history of HTN. He endorses kidney function problems as well. Pt has been urinating, but states \"probably low volume.\"  "

## 2021-10-26 ENCOUNTER — APPOINTMENT (OUTPATIENT)
Dept: CARDIOLOGY | Facility: HOSPITAL | Age: 71
DRG: 699 | End: 2021-10-26
Attending: INTERNAL MEDICINE
Payer: MEDICARE

## 2021-10-26 LAB
ALBUMIN SERPL-MCNC: 3.1 G/DL (ref 3.5–5)
ALP SERPL-CCNC: 60 U/L (ref 45–120)
ALT SERPL W P-5'-P-CCNC: <9 U/L (ref 0–45)
ANION GAP SERPL CALCULATED.3IONS-SCNC: 6 MMOL/L (ref 5–18)
ANION GAP SERPL CALCULATED.3IONS-SCNC: 7 MMOL/L (ref 5–18)
AST SERPL W P-5'-P-CCNC: 10 U/L (ref 0–40)
ATRIAL RATE - MUSE: 61 BPM
BILIRUB SERPL-MCNC: 0.7 MG/DL (ref 0–1)
BNP SERPL-MCNC: 152 PG/ML (ref 0–69)
BUN SERPL-MCNC: 25 MG/DL (ref 8–28)
BUN SERPL-MCNC: 27 MG/DL (ref 8–28)
CALCIUM SERPL-MCNC: 8.6 MG/DL (ref 8.5–10.5)
CALCIUM SERPL-MCNC: 8.8 MG/DL (ref 8.5–10.5)
CHLORIDE BLD-SCNC: 112 MMOL/L (ref 98–107)
CHLORIDE BLD-SCNC: 116 MMOL/L (ref 98–107)
CO2 SERPL-SCNC: 24 MMOL/L (ref 22–31)
CO2 SERPL-SCNC: 24 MMOL/L (ref 22–31)
CREAT SERPL-MCNC: 2.27 MG/DL (ref 0.7–1.3)
CREAT SERPL-MCNC: 2.46 MG/DL (ref 0.7–1.3)
DIASTOLIC BLOOD PRESSURE - MUSE: 70 MMHG
GFR SERPL CREATININE-BSD FRML MDRD: 25 ML/MIN/1.73M2
GFR SERPL CREATININE-BSD FRML MDRD: 28 ML/MIN/1.73M2
GLUCOSE BLD-MCNC: 101 MG/DL (ref 70–125)
GLUCOSE BLD-MCNC: 148 MG/DL (ref 70–125)
INTERPRETATION ECG - MUSE: NORMAL
LVEF ECHO: NORMAL
P AXIS - MUSE: 61 DEGREES
POTASSIUM BLD-SCNC: 4.5 MMOL/L (ref 3.5–5)
POTASSIUM BLD-SCNC: 4.9 MMOL/L (ref 3.5–5)
PR INTERVAL - MUSE: 136 MS
PROT SERPL-MCNC: 5.9 G/DL (ref 6–8)
QRS DURATION - MUSE: 78 MS
QT - MUSE: 410 MS
QTC - MUSE: 412 MS
R AXIS - MUSE: 24 DEGREES
SODIUM SERPL-SCNC: 143 MMOL/L (ref 136–145)
SODIUM SERPL-SCNC: 146 MMOL/L (ref 136–145)
SYSTOLIC BLOOD PRESSURE - MUSE: 163 MMHG
T AXIS - MUSE: 57 DEGREES
VENTRICULAR RATE- MUSE: 61 BPM

## 2021-10-26 PROCEDURE — 83880 ASSAY OF NATRIURETIC PEPTIDE: CPT | Performed by: INTERNAL MEDICINE

## 2021-10-26 PROCEDURE — 99233 SBSQ HOSP IP/OBS HIGH 50: CPT | Performed by: INTERNAL MEDICINE

## 2021-10-26 PROCEDURE — 250N000011 HC RX IP 250 OP 636: Performed by: INTERNAL MEDICINE

## 2021-10-26 PROCEDURE — 250N000013 HC RX MED GY IP 250 OP 250 PS 637: Performed by: INTERNAL MEDICINE

## 2021-10-26 PROCEDURE — 93306 TTE W/DOPPLER COMPLETE: CPT | Mod: 26 | Performed by: INTERNAL MEDICINE

## 2021-10-26 PROCEDURE — G0378 HOSPITAL OBSERVATION PER HR: HCPCS

## 2021-10-26 PROCEDURE — 36415 COLL VENOUS BLD VENIPUNCTURE: CPT | Performed by: INTERNAL MEDICINE

## 2021-10-26 PROCEDURE — 80053 COMPREHEN METABOLIC PANEL: CPT | Performed by: INTERNAL MEDICINE

## 2021-10-26 PROCEDURE — 82040 ASSAY OF SERUM ALBUMIN: CPT | Performed by: INTERNAL MEDICINE

## 2021-10-26 PROCEDURE — 120N000001 HC R&B MED SURG/OB

## 2021-10-26 PROCEDURE — 258N000003 HC RX IP 258 OP 636: Performed by: INTERNAL MEDICINE

## 2021-10-26 PROCEDURE — 255N000002 HC RX 255 OP 636: Performed by: INTERNAL MEDICINE

## 2021-10-26 RX ORDER — TAMSULOSIN HYDROCHLORIDE 0.4 MG/1
0.4 CAPSULE ORAL EVERY EVENING
Status: DISCONTINUED | OUTPATIENT
Start: 2021-10-26 | End: 2021-10-28 | Stop reason: HOSPADM

## 2021-10-26 RX ORDER — AMLODIPINE BESYLATE 5 MG/1
5 TABLET ORAL DAILY
Status: DISCONTINUED | OUTPATIENT
Start: 2021-10-26 | End: 2021-10-28

## 2021-10-26 RX ADMIN — CEFTRIAXONE SODIUM 1 G: 1 INJECTION, POWDER, FOR SOLUTION INTRAMUSCULAR; INTRAVENOUS at 21:39

## 2021-10-26 RX ADMIN — SODIUM CHLORIDE: 9 INJECTION, SOLUTION INTRAVENOUS at 01:16

## 2021-10-26 RX ADMIN — SODIUM CHLORIDE: 9 INJECTION, SOLUTION INTRAVENOUS at 20:01

## 2021-10-26 RX ADMIN — TAMSULOSIN HYDROCHLORIDE 0.4 MG: 0.4 CAPSULE ORAL at 21:38

## 2021-10-26 RX ADMIN — LOSARTAN POTASSIUM 25 MG: 25 TABLET, FILM COATED ORAL at 10:12

## 2021-10-26 RX ADMIN — AMLODIPINE BESYLATE 5 MG: 5 TABLET ORAL at 13:25

## 2021-10-26 RX ADMIN — SODIUM CHLORIDE: 9 INJECTION, SOLUTION INTRAVENOUS at 13:11

## 2021-10-26 RX ADMIN — PERFLUTREN 3 ML: 6.52 INJECTION, SUSPENSION INTRAVENOUS at 09:45

## 2021-10-26 ASSESSMENT — ACTIVITIES OF DAILY LIVING (ADL)
ADLS_ACUITY_SCORE: 7
TOILETING_ISSUES: NO
DIFFICULTY_EATING/SWALLOWING: NO
WALKING_OR_CLIMBING_STAIRS_DIFFICULTY: NO
HEARING_DIFFICULTY_OR_DEAF: NO
WEAR_GLASSES_OR_BLIND: NO
ADLS_ACUITY_SCORE: 7
DOING_ERRANDS_INDEPENDENTLY_DIFFICULTY: NO
FALL_HISTORY_WITHIN_LAST_SIX_MONTHS: NO
ADLS_ACUITY_SCORE: 3
DRESSING/BATHING_DIFFICULTY: NO
ADLS_ACUITY_SCORE: 3
DIFFICULTY_COMMUNICATING: NO
ADLS_ACUITY_SCORE: 3
ADLS_ACUITY_SCORE: 3
CONCENTRATING,_REMEMBERING_OR_MAKING_DECISIONS_DIFFICULTY: NO

## 2021-10-26 NOTE — CONSULTS
Care Management Initial Consult    General Information  Assessment completed with: Patient, Spouse or significant other,  (patient and spouse)  Type of CM/SW Visit: Initial Assessment    Primary Care Provider verified and updated as needed: Yes   Readmission within the last 30 days: no previous admission in last 30 days      Reason for Consult: discharge planning  Advance Care Planning:            Communication Assessment  Patient's communication style: spoken language (English or Bilingual)    Hearing Difficulty or Deaf: no   Wear Glasses or Blind: no    Cognitive  Cognitive/Neuro/Behavioral: WDL                      Living Environment:   People in home: spouse     Current living Arrangements: house      Able to return to prior arrangements: yes       Family/Social Support:  Care provided by: self  Provides care for: no one  Marital Status:              Description of Support System: Supportive         Current Resources:   Patient receiving home care services: No     Community Resources: None  Equipment currently used at home: none  Supplies currently used at home: None    Employment/Financial:  Employment Status:          Financial Concerns:             Lifestyle & Psychosocial Needs:  Social Determinants of Health     Tobacco Use: Low Risk      Smoking Tobacco Use: Never Smoker     Smokeless Tobacco Use: Never Used   Alcohol Use:      Frequency of Alcohol Consumption:      Average Number of Drinks:      Frequency of Binge Drinking:    Financial Resource Strain:      Difficulty of Paying Living Expenses:    Food Insecurity:      Worried About Running Out of Food in the Last Year:      Ran Out of Food in the Last Year:    Transportation Needs:      Lack of Transportation (Medical):      Lack of Transportation (Non-Medical):    Physical Activity:      Days of Exercise per Week:      Minutes of Exercise per Session:    Stress:      Feeling of Stress :    Social Connections:      Frequency of Communication with  Friends and Family:      Frequency of Social Gatherings with Friends and Family:      Attends Hindu Services:      Active Member of Clubs or Organizations:      Attends Club or Organization Meetings:      Marital Status:    Intimate Partner Violence:      Fear of Current or Ex-Partner:      Emotionally Abused:      Physically Abused:      Sexually Abused:    Depression: Not at risk     PHQ-2 Score: 0   Housing Stability:      Unable to Pay for Housing in the Last Year:      Number of Places Lived in the Last Year:      Unstable Housing in the Last Year:        Functional Status:  Prior to admission patient needed assistance:   Dependent ADLs:: Independent  Dependent IADLs:: Independent  Assesssment of Functional Status: At functional baseline    Mental Health Status:          Chemical Dependency Status:                Values/Beliefs:  Spiritual, Cultural Beliefs, Hindu Practices, Values that affect care:                 Additional Information:  AIDET completed. Writer met with Pt and Pts spouse Katlyn: [phone]. Pt lives with spouse in a private residence. He had a Costa catheter put in recently for urinary retention. Pt is independent with all ADL's, has no services and no DME used. He hikes and is very active.     MOON and Observation status given and explained, pt and family verbalized understanding.     Anticipate pt will DC back home without needs.   Family will transport upon DC. Informed that CM will follow progression of care.   Katja Lowe RN, CM, JOSAFAT

## 2021-10-26 NOTE — CONSULTS
Type of consult: inpatient  Place of service: [facility]   Reason for consult:  Urinary retention, hematuria  Requested by: Dr. Chavez    History of present illness:  Mono Galaviz is a 71 year old male with hx of hyperlipidemia and nephrolithiasis; Admitted through the ED 10/25 to the hospital for Urinary retention. A urology consult was placed for urinary retention and hematuria. History obtained through the patient and chart review.     PCP wellness visit note of 6/14/21 reports no obstructive symptoms, non-distended abdomen, opted not to screen for PSA as had screened in the past.     Pt reports at least 3 month hx of worsening abdominal distention, urinary frequency, urgency and nocturia as well as unintentional weight loss and fatigue. Prior to this admission, was waking every hour to void. Reports some chronic decrease in strength of urine stream.     Pt saw [facility] for abdominal mass, identified as distended bladder on CT 10/25/21. Pt was sent to ED for catheter placement. In ED, bladder scanned for >999 ml, create 2.85, navarro placed with 2.7 liter return of clear yellow urine. Renal US 10/25 evening showed severe right-sided hydronephrosis and moderate left-sided hydronephrosis, decompressed bladder with navarro, significant bladder wall thickening, marked prostate enlargement. UA with 75 leuks, 9 RBC, 16 WBC, nitrite neg. UC pending. Pt reports hematuria developed a few hours after catheter placement. Notes resolution of abdominal distention since navarro placement.     Pt reports he has never seen a Urologist and has never taken medications for BPH.     Past Medical History  Past Medical History:   Diagnosis Date     Kidney stone        Past Surgical History  Past Surgical History:   Procedure Laterality Date     C APPENDECTOMY      Description: Appendectomy;  Recorded: 04/25/2013;     TONSILLECTOMY & ADENOIDECTOMY         Social History  Social History     Socioeconomic History     Marital  status:      Spouse name: Not on file     Number of children: 4     Years of education: 16     Highest education level: Not on file   Occupational History     Not on file   Tobacco Use     Smoking status: Never Smoker     Smokeless tobacco: Never Used   Substance and Sexual Activity     Alcohol use: Yes     Alcohol/week: 6.0 - 7.0 standard drinks     Drug use: No     Sexual activity: Yes     Partners: Female   Other Topics Concern     Not on file   Social History Narrative     Not on file     Social Determinants of Health     Financial Resource Strain:      Difficulty of Paying Living Expenses:    Food Insecurity:      Worried About Running Out of Food in the Last Year:      Ran Out of Food in the Last Year:    Transportation Needs:      Lack of Transportation (Medical):      Lack of Transportation (Non-Medical):    Physical Activity:      Days of Exercise per Week:      Minutes of Exercise per Session:    Stress:      Feeling of Stress :    Social Connections:      Frequency of Communication with Friends and Family:      Frequency of Social Gatherings with Friends and Family:      Attends Caodaism Services:      Active Member of Clubs or Organizations:      Attends Club or Organization Meetings:      Marital Status:    Intimate Partner Violence:      Fear of Current or Ex-Partner:      Emotionally Abused:      Physically Abused:      Sexually Abused:        Medications  Current Facility-Administered Medications   Medication     amLODIPine (NORVASC) tablet 5 mg     cefTRIAXone (ROCEPHIN) 1 g vial to attach to  mL bag for ADULTS or NS 50 mL bag for PEDS     hydrALAZINE (APRESOLINE) injection 10 mg     [Held by provider] losartan (COZAAR) tablet 25 mg     melatonin tablet 1 mg     ondansetron (ZOFRAN-ODT) ODT tab 4 mg    Or     ondansetron (ZOFRAN) injection 4 mg     prochlorperazine (COMPAZINE) injection 5 mg    Or     prochlorperazine (COMPAZINE) tablet 5 mg    Or     prochlorperazine (COMPAZINE)  suppository 12.5 mg     sodium chloride 0.9% infusion     tamsulosin (FLOMAX) capsule 0.4 mg       Allergies  No Known Allergies    ROS:  A full 12 point review of systems was taken and is negative aside from what is noted above in the HPI.     Physical examination:  BP (!) 149/70 (BP Location: Right arm)   Pulse 60   Temp 97.9  F (36.6  C) (Oral)   Resp 20   Wt 78.9 kg (174 lb)   SpO2 98%   BMI 23.93 kg/m    General: NAD, alert, cooperative  Head: normocephalic, without abnormality / atraumatic  Abdomen: soft, non- tender,  Non- distended. No suprapubic/tenderness noted. No bilateral CVA tenderness noted  Geniturinary/Rectal: Penis and scrotum without erythema or edema. 16 Fr navarro draining blood-tinged urine with small clot debris. Manually irrigated with return of a few tiny clot flecs, then light pink urine.   Skin: no rashes or lesions over abdomen/groin.   Musculoskeletal: moves all extremities equally  Psychological: alert and oriented, answers questions appropriately.     Labs:   Lab Results   Component Value Date    WBC 5.4 10/25/2021    WBC 5.4 10/25/2021    HGB 11.9 (L) 10/25/2021    HGB 11.9 (L) 10/25/2021    HCT 38.0 (L) 10/25/2021    HCT 38.0 (L) 10/25/2021     (L) 10/25/2021     (L) 10/25/2021    CHOL 164 10/25/2021    TRIG 109 10/25/2021    HDL 42 10/25/2021    ALT <9 10/26/2021    AST 10 10/26/2021     10/26/2021    BUN 25 10/26/2021    CO2 24 10/26/2021    TSH 1.34 10/25/2021    PSA 3.2 09/14/2015       Lab Results   Component Value Date    NITRITE Negative 10/25/2021     UA RESULTS:  Recent Labs   Lab Test 10/25/21  1807   COLOR Colorless   APPEARANCE Clear   URINEGLC Negative   URINEBILI Negative   URINEKETONE Negative   SG 1.009   UBLD 0.06 mg/dL*   URINEPH 6.5   PROTEIN Negative   NITRITE Negative   LEUKEST 75 Sandee/uL*   RBCU 9*   WBCU 16*     Cultures:  Urine Culture 10/25: pending    Lab Results: personally reviewed     Imaging:  EXAM: US RENAL COMPLETE  LOCATION: [facility]  DATE/TIME: 10/25/2021 11:36 PM     INDICATION: Urinary retention.  COMPARISON: CT 10/25/2021  TECHNIQUE: Routine Bilateral Renal and Bladder Ultrasound.     FINDINGS:     RIGHT KIDNEY: 11.8 x 5.6 x 6.5 cm. Severe right-sided hydronephrosis essentially unchanged since the CT of 10/25/2021. Normal renal cortical thickness and echogenicity. No evidence for echogenic intrarenal calculi or exophytic cystic lesion.      LEFT KIDNEY: 11.1 x 6.5 x 6.3 cm. Moderate left-sided hydronephrosis essentially unchanged since the CT of 10/25/2021. Normal renal cortical echogenicity and thickening. No evidence for echogenic intrarenal calculi or exophytic cystic lesions.      BLADDER: The bladder is decompressed by Navarro catheter. Despite this, there still appears to be significant bladder wall thickening at 1.6 cm. Markedly enlarged prostate measuring 6.8 x 7.3 x 6.3 cm.                                                                      IMPRESSION:  1.  Despite drainage of the bladder, there still remains severe right-sided hydronephrosis and moderate left-sided hydronephrosis. This is essentially unchanged since CT of 10/25/2021. Bladder decompressed by Navarro catheter. Allowing for decompression,   there still remains significant bladder wall thickening.     2.  Marked prostate enlargement as detailed above.    I have personally reviewed the images and report and agree with the radiologists interpretation.    Assessment / Plan :  Mono Galaviz is being seen by [facility] for bladder outlet obstruction likely 2/2 enlarged prostate, with bilateral hydronephrosis, JACQUES and hematuria following navarro placement    - Hematuria appears to be clearing. Hgb 11.9 yesterday. UA mildly concerning for infection, UC pending. Receiving IV ceftriaxone.  Manually irrigate prn to maintain navarro patency.   - Creatinine improving, 2.27 this afternoon, 2.46 this AM, 2.85 10/25 vivek. Avoid nephrotoxins and  monitor. Will repeat renal US 10/27 to re-assess hydronephrosis.   - Monitor for postobstructive diuresis, correct electrolytes as needed per protocol  - Initiating Flomax 0.4 mg po daily.   - PSA 10/25 - pending.   - Recommend maintaining catheter at discharge, with outpatient follow up in approximately 2 weeks for trial void and possible cystoscopy with urologist.  - Will continue to follow.   - Old records reviewed - notes since admission, [facility], [facility]. Requested CT report of 10/25 be pushed to [facility], will assess when available.      The patient and I discussed treatment options and their alternatives, including the risks and benefits of each. Including complications that arise from bladder outlet obstruction e.g. urinary retention, hematuria, renal insufficiency, bladder stones and recurrent urinary tract infections. Patient demonstrated understanding. All questions and concerns were answered in detail.     Thank you for consulting [facility] regarding this patient's care. Please contact us with questions or concerns.     Ronnie Salcido, APRN, CNP       [facility]

## 2021-10-26 NOTE — ED NOTES
"Patient informed NPO at midnight. Writer gave patient a turkey sandwich and orange juice per patient request. Patient verbalized understanding and stated he \"hadn't ate all day\".  "

## 2021-10-26 NOTE — ED NOTES
CORINNA Dinero unsuccessful to obtain red top blood tube from patient's IV line. Phlebotomist called and will come to patient for draw.

## 2021-10-26 NOTE — H&P
[facility]    History and Physical - Hospitalist Service       Date of Admission:  10/25/2021    Assessment & Plan      Mono Galaviz is a 71 year old male with PMH of HLD, nephrolithiasis, admitted on 10/25/2021 with:    Urinary retention, with grossly distended bladder.  Onset of symptoms near 6 months ago, with associated unintentional weight loss, fatigue.  Concern for  malignancy.  CT abdomen by [facility]ology earlier today, results not available in EMR.  S/p Costa placement in ER, 2.7 L of yellow urine evacuated.  Known traumatic Costa placement.  Patient does not take aspirin or anticoagulants.  Shortly after Costa placement, developed hematuria with small blood clots.  -IVF  -Check PSA  -Renal ultrasound given history of nephrolithiasis  -Urology consulted    UTI  -Empiric ceftriaxone, follow urine culture    Acute renal failure, likely due to urinary obstruction.  Mild hyperkalemia at 5.3.  At baseline creatinine less than 1, today 2.8.  S/p decompression with Costa catheter.  IVF.  UA looks infected.  Does not take NSAIDs.  -IVF, monitor UA, avoid hypotension nephrotoxins, a.m. labs.    Elevated BP, SBP as high as 218.  No known history of HTN.  Urinary retention likely contributing.  Possibly undiagnosed essential hypertension.  BP remains elevated after bladder decompression.  Started on 25 mg losartan.  -EKG, echo, BNP, troponin, a.m. lipids  -As needed IV hydralazine    Leg edema, symmetrical, left more than right.  Present for at least 6 months.  Patient attributed to numerous sports trauma of left leg.  No known history of CHF.  Homans negative.  -Work-up for CHF and DVT-BNP, alcohol, venous duplex ultrasound.    COVID-19 PCR negative.       Diet: Regular Diet Adult, n.p.o. after midnight for possible urology and renal ultrasound procedures  NPO for Medical/Clinical Reasons Except for: Meds    DVT Prophylaxis: Low Risk/Ambulatory with no VTE prophylaxis indicated and  Pneumatic Compression Devices  Costa Catheter: PRESENT, indication: Retention  Central Lines: None  Code Status:   Full    Clinically Significant Risk Factors Present on Admission                   Disposition Plan   Expected discharge:  recommended to prior living arrangement once adequate pain management/ tolerating PO medications and Renal function normalized, seen by urology.     The patient's care was discussed with the Bedside Nurse, Patient and Patient's Family.    Sue Chavez MD  [facility]  Securely message with the Vocera Web Console (learn more here)  Text page via [facility] Paging/Directory      ______________________________________________________________________    Chief Complaint   Abdominal distention    History of Present Illness   Mono Galaviz is a 71 year old male who with PMH of nephrolithiasis, HLD, otherwise healthy, referred to ED by [facility] radiology for evaluation of distended bladder seen on CT abdomen/urinary retention.  Patient patient was complaining of abdominal distention, pain, urinary frequency.  Symptoms 10 since June, when he developed urinary frequency, during daytime urinating every 2 hours, at night every 0.5-2 hours, without associated dysuria, hematuria.  The last months he noted abdominal distention/mass.  He tried to facilitate bladder emptying by pushing on abdominal mass.  New 10 days ago he had colonoscopy, and complained to GI physician on abdominal distention.  CT abdomen was ordered by GI, was done today, and showed grossly distended bladder.  Costa catheter was placed in ED, drained 2.7 L of yellow urine.  Later in ED patient developed mild hematuria.  He endorses weakness, unintentional weight loss of 10 pounds over the last 6 weeks.  Last year patient was try to lose some weight by limiting fat consumption.  He denies any fever, saddle anesthesia, back pain, or weakness in his legs. No regular ibuprofen use.        A few months ago  "patient noticed some left leg swelling.  He attributed this to multiple sports sprains.  No associated chest pain, dyspnea, cough, cardiac palpitations.  Noted elevated BP, 210/85 admission.  No known history of HTN.  After bladder decompression, SBP down to 170-190s.  Noted creatinine 2.85, BUN 30, last check creatinine normal 0.98 on 6/14/2021.  Hyperkalemia at 5.2.  No leukocytosis.  UA with positive leukoesterase, pyuria.     He denies any fever, saddle anesthesia, back pain, or weakness in his legs. No regular ibuprofen use.     Review of Systems    The 10 point Review of Systems is negative other than noted in the HPI.    Past Medical History    I have reviewed this patient's medical history and updated it with pertinent information if needed.   Past Medical History:   Diagnosis Date     Kidney stone        Past Surgical History   I have reviewed this patient's surgical history and updated it with pertinent information if needed.  Past Surgical History:   Procedure Laterality Date     C APPENDECTOMY      Description: Appendectomy;  Recorded: 04/25/2013;     TONSILLECTOMY & ADENOIDECTOMY       Social History   I have reviewed this patient's social history and updated it with pertinent information if needed.  Social History     Tobacco Use     Smoking status: Never Smoker     Smokeless tobacco: Never Used   Substance Use Topics     Alcohol use: Yes     Alcohol/week: 6.0 - 7.0 standard drinks     Drug use: No       Family History   I have reviewed this patient's family history and updated it with pertinent information if needed.  Family History   Problem Relation Age of Onset     Dementia Mother 92.00     Cancer Father         lung     Emphysema Maternal Grandfather      Cancer Maternal Grandmother         ovarian     No Known Problems Daughter      No Known Problems Son      No Known Problems Brother      No Known Problems Daughter      Parkinsonism Daughter         \"Parkison's like.\"      Breast Cancer No " family hx of      Colon Cancer No family hx of      Prostate Cancer No family hx of      Diabetes No family hx of      Heart Disease No family hx of        Prior to Admission Medications   None     Allergies   No Known Allergies    Physical Exam   Vital Signs: Temp: 97.7  F (36.5  C) Temp src: Temporal BP: (!) 207/96 Pulse: 66   Resp: 16 SpO2: 98 % O2 Device: None (Room air)    Weight: 174 lbs 0 oz    General: Alert and oriented x 3. Not in obvious distress.  HEENT: NC, AT. Neck- supple, No JVP elevation, lymphadenopathy or thyromegaly. Trachea-central.  Chest: Clear to auscultation bilaterally.  Heart: S1S2 regular. No M/R/G.  Abdomen: Soft. NT, ND. No organomegaly. Bowel sounds- active.  Costa catheter in place, draining blood-tinged urine with small blood clots.  Back: No spine tenderness. No CVA tenderness.  Extremities: No leg swelling. Peripheral pulses 2+ bilaterally.  Neuro: Cranial nerves 1-12 grossly normal. No focal neurological deficit    Data   Data reviewed today: I reviewed all medications, new labs and imaging results over the last 24 hours. I personally reviewed      Recent Labs   Lab 10/25/21  1659   WBC 5.4   HGB 11.9*   MCV 99   *      POTASSIUM 5.2*   CHLORIDE 110*   CO2 27   BUN 30*   CR 2.85*   ANIONGAP 7   JONN 9.9   GLC 95     No results found for this or any previous visit (from the past 24 hour(s)).

## 2021-10-26 NOTE — PLAN OF CARE
PRIMARY DIAGNOSIS: ACUTE PAIN  OUTPATIENT/OBSERVATION GOALS TO BE MET BEFORE DISCHARGE:  1. Pain Status: Pain free.    2. Return to near baseline physical activity: Yes    3. Cleared for discharge by consultants (if involved): No    Discharge Planner Nurse   Safe discharge environment identified: Yes  Barriers to discharge: Yes. Urine output bloody red.       Entered by: Lisa Del Real 10/26/2021 7:09 AM     Please review provider order for any additional goals.   Nurse to notify provider when observation goals have been met and patient is ready for discharge.

## 2021-10-26 NOTE — PLAN OF CARE
"PRIMARY DIAGNOSIS: \"GENERIC\" NURSING  OUTPATIENT/OBSERVATION GOALS TO BE MET BEFORE DISCHARGE:  ADLs back to baseline: Yes    Activity and level of assistance: Ambulating independently.    Pain status: Pain free.    Return to near baseline physical activity: Yes     Discharge Planner Nurse   Safe discharge environment identified: Yes  Barriers to discharge: Yes creatinine, hematuria       Entered by: Kellen Jose 10/26/2021 3:41 PM     Please review provider order for any additional goals.   Nurse to notify provider when observation goals have been met and patient is ready for discharge.  "

## 2021-10-26 NOTE — PLAN OF CARE
"PRIMARY DIAGNOSIS: \"GENERIC\" NURSING  OUTPATIENT/OBSERVATION GOALS TO BE MET BEFORE DISCHARGE:  ADLs back to baseline: Yes    Activity and level of assistance: Up with standby assistance.    Pain status: Pain free.    Return to near baseline physical activity: Yes     Discharge Planner Nurse   Safe discharge environment identified: Yes  Barriers to discharge: Yes. Urine output is still bloody       Entered by: Lisa Del Real 10/26/2021 7:11 AM     Please review provider order for any additional goals.   Nurse to notify provider when observation goals have been met and patient is ready for discharge.  "

## 2021-10-26 NOTE — PROGRESS NOTES
Hospitalist Progress Note    Assessment/Plan  Mono Galaviz is a 71 year old male with PMH of HLD, nephrolithiasis,  ~ 5 months history of progressive abdominal discomfort and distention, increasing urinary frequency and urgency.   CT abdomen at [facility] (report is not available) demonstrated distended bladder.  Costa catheter was placed with immediate return of 2.7 L of yellow urine with subsequent hematuria.       1.  Urinary retention with bilateral hydronephrosis.    Onset of symptoms ~ 5 months ago, with associated unintentional weight loss, fatigue concerning for  malignancy.  PSA is pending.  S/p Costa placement in ER, 2.7 L of yellow urine evacuated.    Urology consult is pending.  Renal ultrasound 10/26 demonstrats persistent severe bilateral hydronephrosis - will need repeat imaging.  Will likely discharge home with Costa with outpatient follow up.   Monitor for postobstructive diuresis - continue IV fluids for now and allow PO  2.  Gross hematuria.  Likely due to traumatic Costa catheter placement.  Defer to Urology if patient needs CBI.  Monitor Hb  3.  UTI.  Continue empiric ceftriaxone pending urine culture results  4.  Elevated creatinine, suspect acute kidney injury secondary to obstruction.  With symptoms going on for 5 months will see how much recovery he makes   5.  Elevated BP without previous history of hypertension.  Pain was likely contributing, however BP remains elevated after bladder decompression.  Hold off on ARB given acute kidney injury.  Start amlodipine 5 mg daily and and flomax 0.4 mg daily titrate as needed  6.  Bilateral LE edema, suspect dependent edema given urinary retention.  No evidence of DVT.  BNP is slightly elevated but echocardiogram is fairly unremarkable       Barriers to Discharge:  Hematuria     Anticipated discharge date/Disposition: home in 1-2 days     Subjective  Patient reports that abdominal discomfort improved significantly.  Continues to have  cherry colored hematuria.  Feels thirsty    Objective    Vital signs in last 24 hours  Temp:  [97.7  F (36.5  C)-98.7  F (37.1  C)] 98.7  F (37.1  C)  Pulse:  [54-66] 61  Resp:  [16-19] 19  BP: (134-227)/(63-96) 175/81  SpO2:  [96 %-100 %] 98 % @LASTSAO2(12)@ O2 Device: None (Room air)    Weight:   Wt Readings from Last 3 Encounters:   10/25/21 78.9 kg (174 lb)   06/14/21 82.1 kg (181 lb)   10/28/19 88.5 kg (195 lb)      Weight change:     Intake/Output last 3 shifts  I/O last 3 completed shifts:  In: 1000 [IV Piggyback:1000]  Out: 7900 [Urine:7900]  Body mass index is 23.93 kg/m .    Physical Exam    General Appearance:    Alert, cooperative, no distress, appears stated age   Lungs:     Clear bilaterally    Cardiovascular:    Regular rate ands rhythm.  Nornal S1, S2.  No murmur, rub or gallop.  No edema   Abdomen:     Soft, non-tender, bowel sounds active all four quadrants,     no masses, no organomegaly   :    Costa cathter draining cherry color urine    Neurologic:   Awake, alert, oriented x 3.  Grossly nonfocal      Pertinent Labs   Lab Results: personally reviewed.   Recent Labs   Lab 10/26/21  0740 10/25/21  2302 10/25/21  1659   * 144 144   CO2 24 22 27   BUN 27 28 30*   ALBUMIN 3.1*  --   --    ALKPHOS 60  --   --    ALT <9  --   --    AST 10  --   --      Recent Labs   Lab 10/25/21  1659   WBC 5.4  5.4   HGB 11.9*  11.9*   HCT 38.0*  38.0*   *  149*     No results for input(s): CKTOTAL, TROPONINI in the last 168 hours.    Invalid input(s): TROPONINT, CKMBINDEX  Invalid input(s): POCGLUFGR    Medications  Current Facility-Administered Medications   Medication     cefTRIAXone (ROCEPHIN) 1 g vial to attach to  mL bag for ADULTS or NS 50 mL bag for PEDS     hydrALAZINE (APRESOLINE) injection 10 mg     [Held by provider] losartan (COZAAR) tablet 25 mg     melatonin tablet 1 mg     ondansetron (ZOFRAN-ODT) ODT tab 4 mg    Or     ondansetron (ZOFRAN) injection 4 mg     prochlorperazine  (COMPAZINE) injection 5 mg    Or     prochlorperazine (COMPAZINE) tablet 5 mg    Or     prochlorperazine (COMPAZINE) suppository 12.5 mg     sodium chloride 0.9% infusion       Pertinent Radiology   Radiology Results: Personally reviewed   Results for orders placed or performed during the hospital encounter of 10/25/21   US Lower Extremity Venous Duplex Bilateral    Impression    IMPRESSION: No evidence of thrombus in the major veins of bilateral lower extremities.    US Renal Complete    Impression    IMPRESSION:  1.  Despite drainage of the bladder, there still remains severe right-sided hydronephrosis and moderate left-sided hydronephrosis. This is essentially unchanged since CT of 10/25/2021. Bladder decompressed by Costa catheter. Allowing for decompression,   there still remains significant bladder wall thickening.    2.  Marked prostate enlargement as detailed above.   Echocardiogram Complete   Result Value Ref Range    LVEF  60-65%          Advanced Care Planning:  Discharge Planning discussed with patient and wife         Radha Dow MD  Internal Medicine Hospitalist  10/26/2021

## 2021-10-26 NOTE — PHARMACY-ADMISSION MEDICATION HISTORY
Pharmacy Note - Admission Medication History    Pertinent Provider Information:      ______________________________________________________________________    Prior To Admission (PTA) med list completed and updated in EMR.       No outpatient medications have been marked as taking for the 10/25/21 encounter (Hospital Encounter).       Information source(s): Patient  Method of interview communication: in-person    Summary of Changes to PTA Med List  New:   Discontinued:   Changed:     Patient was asked about OTC/herbal products specifically.  PTA med list reflects this.    In the past week, patient estimated taking medication this percent of the time:  greater than 90%.    Allergies were reviewed, assessed, and updated with the patient.      Patient does not use any multi-dose medications prior to admission.    The information provided in this note is only as accurate as the sources available at the time of the update(s).    Thank you for the opportunity to participate in the care of this patient.    Della Ely [facility]  10/25/2021 8:08 PM

## 2021-10-26 NOTE — ED NOTES
[facility] ED Handoff Report    ED Chief Complaint:     ED Diagnosis:  (R03.0) Elevated blood pressure reading without diagnosis of hypertension  (primary encounter diagnosis)  Comment:   Plan: perflutren lipid microsphere (DEFINITY)         injection SUSP            (R33.9) Urinary retention  Comment:   Plan:        PMH:    Past Medical History:   Diagnosis Date     Kidney stone         Code Status:  Full Code     Falls Risk: No Band: Not applicable    Current Living Situation/Residence: lives alone     Elimination Status: Continent: Yes     Activity Level: SBA    Patients Preferred Language:  English     Needed: No    Vital Signs:  BP (!) 155/71   Pulse 61   Temp 98.2  F (36.8  C) (Oral)   Resp 16   Wt 78.9 kg (174 lb)   SpO2 98%   BMI 23.93 kg/m       Cardiac Rhythm: heart rate regular    Pain Score: 0/10    Is the Patient Confused:  No    Last Food or Drink: None     Focused Assessment:  Tender abdomen upon palpation. No other symptoms.     Tests Performed: Done: Labs    Treatments Provided:  New oral BP med, navarro cath in place draining blood tinged urine, IVF, NPO    Family Dynamics/Concerns: No    Family Updated On Visitor Policy: Yes    Plan of Care Communicated to Family: Yes    Who Was Updated about Plan of Care: patient    Belongings Checklist Done and Signed by Patient: Yes    Covid: asymptomatic , negative    Additional Information: n/a    Elizabeth Hanna RN  10/26/2021 11:36 AM

## 2021-10-26 NOTE — UTILIZATION REVIEW
Admission Status; Secondary Review Determination       Under the authority of the Utilization Management Committee, the utilization review process indicated a secondary review on the above patient. The review outcome is based on review of the medical records, discussions with staff, and applying clinical experience noted on the date of the review.     (x) Inpatient Status Appropriate - This patient's medical care is consistent with medical management for inpatient care and reasonable inpatient medical practice.     RATIONALE FOR DETERMINATION   Mr. Galaviz is a 72 yo male pt who presents to the ED with progressive abd discomfort and distension.  US today reveals persistent severe bilateral hydronephrosis despite IVF overnight and placement of navarro on admission (retention noted).  Remains on IVF at 150cc/hr.  JACQUES is slowly improving with treatment but is still not near baseline.  Urology consult requested; CBI being considered for persistent hematuria.  Remains on IV abx for suspected UTI.       At the time of admission with the information available to the attending physician more than 2 nights Hospital complex care was anticipated, based on patient risk of adverse outcome if treated as outpatient and complex care required. Inpatient admission is appropriate based on the Medicare guidelines.     The information on this document is developed by the utilization review team in order for the business office to ensure compliance. This only denotes the appropriateness of proper admission status and does not reflect the quality of care rendered.   The definitions of Inpatient Status and Observation Status used in making the determination above are those provided in the CMS Coverage Manual, Chapter 1 and Chapter 6, section 70.4.         Sincerely,     Diamond Aguilera, DO  Utilization Review  Physician Advisor  [facility].

## 2021-10-26 NOTE — ED NOTES
Costa placed with return of 2700 ml clear yellow urine. Pt tolerated well, reports improvement in abdominal discomfort.

## 2021-10-27 ENCOUNTER — APPOINTMENT (OUTPATIENT)
Dept: ULTRASOUND IMAGING | Facility: HOSPITAL | Age: 71
DRG: 699 | End: 2021-10-27
Attending: FAMILY MEDICINE
Payer: MEDICARE

## 2021-10-27 LAB
ANION GAP SERPL CALCULATED.3IONS-SCNC: 6 MMOL/L (ref 5–18)
BACTERIA UR CULT: NO GROWTH
BUN SERPL-MCNC: 17 MG/DL (ref 8–28)
CALCIUM SERPL-MCNC: 8.5 MG/DL (ref 8.5–10.5)
CHLORIDE BLD-SCNC: 113 MMOL/L (ref 98–107)
CO2 SERPL-SCNC: 24 MMOL/L (ref 22–31)
CREAT SERPL-MCNC: 1.9 MG/DL (ref 0.7–1.3)
GFR SERPL CREATININE-BSD FRML MDRD: 35 ML/MIN/1.73M2
GLUCOSE BLD-MCNC: 94 MG/DL (ref 70–125)
HGB BLD-MCNC: 10.5 G/DL (ref 13.3–17.7)
POTASSIUM BLD-SCNC: 4.7 MMOL/L (ref 3.5–5)
PSA FREE MFR SERPL: 38 %
PSA FREE SERPL-MCNC: 2.4 NG/ML
PSA SERPL IA-MCNC: 6.4 NG/ML
SODIUM SERPL-SCNC: 143 MMOL/L (ref 136–145)

## 2021-10-27 PROCEDURE — 76770 US EXAM ABDO BACK WALL COMP: CPT

## 2021-10-27 PROCEDURE — 85018 HEMOGLOBIN: CPT | Performed by: INTERNAL MEDICINE

## 2021-10-27 PROCEDURE — 250N000011 HC RX IP 250 OP 636: Performed by: INTERNAL MEDICINE

## 2021-10-27 PROCEDURE — 120N000001 HC R&B MED SURG/OB

## 2021-10-27 PROCEDURE — 80048 BASIC METABOLIC PNL TOTAL CA: CPT | Performed by: INTERNAL MEDICINE

## 2021-10-27 PROCEDURE — 250N000013 HC RX MED GY IP 250 OP 250 PS 637: Performed by: INTERNAL MEDICINE

## 2021-10-27 PROCEDURE — 258N000003 HC RX IP 258 OP 636: Performed by: INTERNAL MEDICINE

## 2021-10-27 PROCEDURE — 99232 SBSQ HOSP IP/OBS MODERATE 35: CPT | Performed by: FAMILY MEDICINE

## 2021-10-27 PROCEDURE — 36415 COLL VENOUS BLD VENIPUNCTURE: CPT | Performed by: INTERNAL MEDICINE

## 2021-10-27 RX ADMIN — CEFTRIAXONE SODIUM 1 G: 1 INJECTION, POWDER, FOR SOLUTION INTRAMUSCULAR; INTRAVENOUS at 21:12

## 2021-10-27 RX ADMIN — SODIUM CHLORIDE: 9 INJECTION, SOLUTION INTRAVENOUS at 16:54

## 2021-10-27 RX ADMIN — TAMSULOSIN HYDROCHLORIDE 0.4 MG: 0.4 CAPSULE ORAL at 21:10

## 2021-10-27 RX ADMIN — AMLODIPINE BESYLATE 5 MG: 5 TABLET ORAL at 09:00

## 2021-10-27 RX ADMIN — SODIUM CHLORIDE: 9 INJECTION, SOLUTION INTRAVENOUS at 10:07

## 2021-10-27 RX ADMIN — SODIUM CHLORIDE: 9 INJECTION, SOLUTION INTRAVENOUS at 03:18

## 2021-10-27 ASSESSMENT — ACTIVITIES OF DAILY LIVING (ADL)
ADLS_ACUITY_SCORE: 3

## 2021-10-27 NOTE — PROGRESS NOTES
Hospitalist Progress Note    Assessment/Plan  Mono Galaviz is a 71 year old male with PMH of HLD, nephrolithiasis,  ~ 5 months history of progressive abdominal discomfort and distention, increasing urinary frequency and urgency.   CT abdomen at [place] radi[facility] (report is not available) demonstrated distended bladder.  Costa catheter was placed with immediate return of 2.7 L of yellow urine with subsequent hematuria.       1.  Urinary retention with bilateral hydronephrosis.    Onset of symptoms ~ 5 months ago, with associated unintentional weight loss, fatigue concerning for  malignancy.  PSA is pending.  S/p Costa placement in ER, 2.7 L of yellow urine evacuated.    Urology consult appreciated.  Renal ultrasound 10/26 demonstrats persistent severe bilateral hydronephrosis.  Repeat imaging today shows slight improvement.  Will likely discharge home with Costa with outpatient follow up tomorrow.   Monitor for postobstructive diuresis.  Continue IV NS at 125 ml/hr down from 150 ml/hr.  2.  Gross hematuria.  Likely due to traumatic Costa catheter placement.  Defer to Urology if patient would need CBI.  Recheck Hgb tomorrow  3.  UTI.  On empiric ceftriaxone.  NGTD.  4.  Elevated creatinine, suspect acute kidney injury secondary to obstruction.  With symptoms going on for 5 months will see how much recovery he makes.  Recheck creat tomorrow.  5.  Elevated BP without previous history of hypertension.  Pain was likely contributing, however BP remains elevated after bladder decompression.  Hold off on ARB given acute kidney injury.  Started amlodipine 5 mg daily and and flomax 0.4 mg daily titrate as needed.  Currently BP WNL.  6.  Bilateral LE edema, suspect dependent edema given urinary retention.  No evidence of DVT.  BNP is slightly elevated but echocardiogram is fairly unremarkable.       Barriers to Discharge:  Hematuria, JACQUES    Anticipated discharge date/Disposition: home tomorrow if he continues to improve.      Subjective  Patient continues to have dark urine has no symptoms of abdominal pain at this time.  No back pain or flank pain.  No chest pain shortness of breath fever chills nausea vomiting or neurologic changes.    Objective    Vital signs in last 24 hours  Temp:  [97.8  F (36.6  C)-98.4  F (36.9  C)] 98.4  F (36.9  C)  Pulse:  [58-69] 58  Resp:  [16-20] 16  BP: (120-141)/(60-70) 127/68  SpO2:  [97 %-99 %] 99 % @LASTSAO2(12)@ O2 Device: None (Room air)    Weight:   Wt Readings from Last 3 Encounters:   10/25/21 78.9 kg (174 lb)   06/14/21 82.1 kg (181 lb)   10/28/19 88.5 kg (195 lb)      Weight change:     Intake/Output last 3 shifts  I/O last 3 completed shifts:  In: 1920 [P.O.:1320; I.V.:600]  Out: 4400 [Urine:4400]  Body mass index is 23.93 kg/m .    Physical Exam    General Appearance:    Alert, cooperative, no distress, appears stated age   Lungs:    No respiratory distress   Cardiovascular:   Lower extremity edema is present no cyanosis       :    Costa cathter draining dark urine   Neurologic:   Awake, alert, oriented x 3.  Grossly nonfocal      Pertinent Labs   Lab Results: personally reviewed.   Recent Labs   Lab 10/27/21  0729 10/26/21  1345 10/26/21  0740    143 146*   CO2 24 24 24   BUN 17 25 27   ALBUMIN  --   --  3.1*   ALKPHOS  --   --  60   ALT  --   --  <9   AST  --   --  10     Recent Labs   Lab 10/27/21  0729 10/25/21  1659   WBC  --  5.4  5.4   HGB 10.5* 11.9*  11.9*   HCT  --  38.0*  38.0*   PLT  --  149*  149*     No results for input(s): CKTOTAL, TROPONINI in the last 168 hours.    Invalid input(s): TROPONINT, CKMBINDEX  Invalid input(s): POCGLUFGR    Medications  Current Facility-Administered Medications   Medication     amLODIPine (NORVASC) tablet 5 mg     cefTRIAXone (ROCEPHIN) 1 g vial to attach to  mL bag for ADULTS or NS 50 mL bag for PEDS     hydrALAZINE (APRESOLINE) injection 10 mg     [START ON 10/28/2021] influenza vac high-dose quad (FLUZONE HD) injection MAGALI  0.7 mL     melatonin tablet 1 mg     ondansetron (ZOFRAN-ODT) ODT tab 4 mg    Or     ondansetron (ZOFRAN) injection 4 mg     prochlorperazine (COMPAZINE) injection 5 mg    Or     prochlorperazine (COMPAZINE) tablet 5 mg    Or     prochlorperazine (COMPAZINE) suppository 12.5 mg     sodium chloride 0.9% infusion     tamsulosin (FLOMAX) capsule 0.4 mg       Pertinent Radiology   Radiology Results: Personally reviewed   Results for orders placed or performed during the hospital encounter of 10/25/21   US Lower Extremity Venous Duplex Bilateral    Impression    IMPRESSION: No evidence of thrombus in the major veins of bilateral lower extremities.    US Renal Complete    Impression    IMPRESSION:  1.  Despite drainage of the bladder, there still remains severe right-sided hydronephrosis and moderate left-sided hydronephrosis. This is essentially unchanged since CT of 10/25/2021. Bladder decompressed by Costa catheter. Allowing for decompression,   there still remains significant bladder wall thickening.    2.  Marked prostate enlargement as detailed above.   Echocardiogram Complete   Result Value Ref Range    LVEF  60-65%          Advanced Care Planning:  Discharge Planning discussed with patient and wife

## 2021-10-27 NOTE — PLAN OF CARE
Problem: Urinary Retention  Goal: Effective Urinary Elimination  Outcome: Improving   Indwelling navarro is patent of cherry red o/p with tiny (non-obstructing) clots. Adequate o/p; strict I&O.    Problem: UTI (Urinary Tract Infection)  Goal: Improved Infection Symptoms  Outcome: Improving   Pt has been afebrile, denies any s/sx. Received rocephin, has IVF at 150 ml/hr.   Denies any pain or discomfort.

## 2021-10-27 NOTE — PROGRESS NOTES
Place of Service:  [facility]     Reason for follow up: Bladder outlet obstruction likely 2/2 enlarged prostate, with bilateral hydronephrosis, JACQUES and hematuria following navarro placement    SUBJECTIVE:  Events: No acute events.     Pt reports urine color clears when at rest, becomes more bloody with activity. No problems with navarro overnight. Denies abdominal and bilateral flank pain, fever, chills.     OBJECTIVE:  PHYSICAL EXAM:  Temp: 98.4  F (36.9  C) Temp src: Oral BP: 127/68 Pulse: 58   Resp: 16 SpO2: 99 % O2 Device: None (Room air)    General: NAD, alert, cooperative  Head: normocephalic, without abnormality / atraumatic  Abdomen: soft, non- tender,  Non- distended. No suprapubic/tenderness noted. No bilateral CVA tenderness noted.  Geniturinary/Rectal: Penis and scrotum without erythema or edema. 16 Fr navarro draining translucent cherry colored urine, no clots or debris noted.  blood-tinged urine with small clot debris. Manually irrigated with return of light pink urine, no clots.   Skin: no rashes or lesions over abdomen/groin.   Musculoskeletal: moves all extremities equally  Psychological: alert and oriented, answers questions appropriately.    LABS:  Creatinine   Date Value Ref Range Status   10/27/2021 1.90 (H) 0.70 - 1.30 mg/dL Final     WBC Count   Date Value Ref Range Status   10/25/2021 5.4 4.0 - 11.0 10e3/uL Final   10/25/2021 5.4 4.0 - 11.0 10e3/uL Final     Hemoglobin   Date Value Ref Range Status   10/27/2021 10.5 (L) 13.3 - 17.7 g/dL Final     Platelet Count   Date Value Ref Range Status   10/25/2021 149 (L) 150 - 450 10e3/uL Final     Creatinine   Date Value Ref Range Status   10/27/2021 1.90 (H) 0.70 - 1.30 mg/dL Final     UA:  UA RESULTS:  Recent Labs   Lab Test 10/25/21  1807   COLOR Colorless   APPEARANCE Clear   URINEGLC Negative   URINEBILI Negative   URINEKETONE Negative   SG 1.009   UBLD 0.06 mg/dL*   URINEPH 6.5   PROTEIN Negative   NITRITE Negative   LEUKEST 75 Sandee/uL*    RBCU 9*   WBCU 16*     Cultures:  Urine 10/25: No growth     Lab Results: personally reviewed.     IMAGING:  EXAM: US RENAL COMPLETE  LOCATION: [facility]  DATE/TIME: 10/27/2021 1:37 PM     INDICATION: Follow-up bilateral pyelo.     COMPARISON: CT abdomen and pelvis 10/25/2021.     TECHNIQUE: Routine Bilateral Renal and Bladder Ultrasound.     FINDINGS:  RIGHT KIDNEY: 11.3 x 5.4 x 4.7 cm. There is mild right hydronephrosis, significantly improved from comparison CT scan.      LEFT KIDNEY: 11.7 x 6.3 x 7.2 cm. Moderate left-sided hydronephrosis, similar to prior CT. Normal without hydronephrosis or masses.      BLADDER: The urinary bladder is decompressed by an indwelling Navarro catheter. There is marked urinary bladder wall thickening.                                                                      IMPRESSION:  1.  Bilateral hydronephrosis, mild on the right and moderate on the left. The left hydronephrosis is stable with significant improvement in the right hydronephrosis.  2.  Marked urinary bladder wall thickening. The urinary bladder is decompressed by an indwelling Navarro catheter.    ASSESSMENT/PLAN:  Mono Galaviz is being seen by [facility] for bladder outlet obstruction likely 2/2 enlarged prostate, with bilateral hydronephrosis, JACQUES and hematuria following navarro placement    - UC 10/25: no growth. Remains afebrile and WBC WNL 10/25. Ok to discontinue antibiotic.   - Urine now clearing at rest but translucent cherry after activity. Hgb 10.5 (11.9 on 10/25). Manually irrigate catheter PRN for patency. Monitor.   - Creatinine improving, 1.90 today and 2.46 yesterday. Avoid nephrotoxins. Monitor.    - Stable moderate left hydro, improving right hydro and marked bladder wall thickening on repeat US today. In absence of obvious infection, without flank/abdominal pain and with improving creatinine, will hold on left ureteral stent placement or PNT and continue to monitor  closely.   - Continue Flomax 0.4 mg po daily.   - PSA pending.     This case was discussed with:  Dr. Regla Salcido, APRN, CNP  [facility]   [phone]

## 2021-10-27 NOTE — PLAN OF CARE
Problem: Urinary Retention  Goal: Effective Urinary Elimination  Outcome: Improving  -Costa catheter with good urine output.  -Urine output red in color after activity and pink when at rest.  -No clots noted.          -Denies pain  -Up independently in room and halls

## 2021-10-28 VITALS
BODY MASS INDEX: 23.93 KG/M2 | RESPIRATION RATE: 16 BRPM | HEART RATE: 68 BPM | SYSTOLIC BLOOD PRESSURE: 131 MMHG | TEMPERATURE: 97.9 F | OXYGEN SATURATION: 98 % | DIASTOLIC BLOOD PRESSURE: 67 MMHG | WEIGHT: 174 LBS

## 2021-10-28 LAB
ANION GAP SERPL CALCULATED.3IONS-SCNC: 5 MMOL/L (ref 5–18)
BUN SERPL-MCNC: 17 MG/DL (ref 8–28)
CALCIUM SERPL-MCNC: 8.2 MG/DL (ref 8.5–10.5)
CHLORIDE BLD-SCNC: 112 MMOL/L (ref 98–107)
CO2 SERPL-SCNC: 24 MMOL/L (ref 22–31)
CREAT SERPL-MCNC: 1.68 MG/DL (ref 0.7–1.3)
GFR SERPL CREATININE-BSD FRML MDRD: 40 ML/MIN/1.73M2
GLUCOSE BLD-MCNC: 91 MG/DL (ref 70–125)
HGB BLD-MCNC: 10.4 G/DL (ref 13.3–17.7)
POTASSIUM BLD-SCNC: 4 MMOL/L (ref 3.5–5)
SODIUM SERPL-SCNC: 141 MMOL/L (ref 136–145)

## 2021-10-28 PROCEDURE — 82310 ASSAY OF CALCIUM: CPT | Performed by: INTERNAL MEDICINE

## 2021-10-28 PROCEDURE — 99239 HOSP IP/OBS DSCHRG MGMT >30: CPT | Performed by: INTERNAL MEDICINE

## 2021-10-28 PROCEDURE — 250N000013 HC RX MED GY IP 250 OP 250 PS 637: Performed by: INTERNAL MEDICINE

## 2021-10-28 PROCEDURE — 85018 HEMOGLOBIN: CPT | Performed by: INTERNAL MEDICINE

## 2021-10-28 PROCEDURE — 36415 COLL VENOUS BLD VENIPUNCTURE: CPT | Performed by: INTERNAL MEDICINE

## 2021-10-28 RX ORDER — TAMSULOSIN HYDROCHLORIDE 0.4 MG/1
0.4 CAPSULE ORAL EVERY EVENING
Qty: 30 CAPSULE | Refills: 1 | Status: SHIPPED | OUTPATIENT
Start: 2021-10-28 | End: 2021-11-12

## 2021-10-28 RX ADMIN — AMLODIPINE BESYLATE 5 MG: 5 TABLET ORAL at 08:12

## 2021-10-28 ASSESSMENT — ACTIVITIES OF DAILY LIVING (ADL)
ADLS_ACUITY_SCORE: 3

## 2021-10-28 NOTE — DISCHARGE SUMMARY
[facility] MEDICINE  DISCHARGE SUMMARY     Primary Care Physician: Dru Lopez  Admission Date: 10/25/2021   Discharge Provider: Charles Littlejohn MD Discharge Date: 10/28/2021   Diet: As given below   Code Status: Full Code   Activity: As tolerated        Condition at Discharge: Stable     REASON FOR PRESENTATION(See Admission Note for Details)   Urinary retention    PRINCIPAL & ACTIVE DISCHARGE DIAGNOSES   Acute renal failure post renal secondary to urinary retention with bilateral hydronephrosis  Accelerated hypertension likely due to urinary retention  Bilateral leg edema      SIGNIFICANT FINDINGS (Imaging, labs):   Echocardiogram Complete    Result Date: 10/26/2021  [id number]^[id number]^[place] [address]  Name: NANCY MERCEDES MRN: [id number] : 1950 Study Date: 10/26/2021 09:15 AM Age: 71 yrs Gender: Male Patient Location: [facility] Reason For Study: Hypertension (HTN) Ordering Physician: CHAVO ZAPATA Performed By: INNA  BSA: 2.0 m2 Height: 71 in Weight: 174 lb HR: 56 BP: 143/70 mmHg ______________________________________________________________________________ Procedure Definity (NDC #87755-251) given intravenously. ______________________________________________________________________________ Interpretation Summary  Left ventricular size, wall motion and function are normal. The ejection fraction is 60-65%. There is mild concentric left ventricular hypertrophy. Normal right ventricle size and systolic function. Mildly dilated aortic root. No hemodynamically significant valvular abnormalities on 2D or color flow imaging. ______________________________________________________________________________ Left Ventricle Left ventricular size, wall motion and function are normal. The ejection fraction is 60-65%. There is mild concentric left ventricular hypertrophy. Left ventricular diastolic  function is normal. No regional wall motion abnormalities noted.  Right Ventricle Normal right ventricle size and systolic function.  Atria Normal left atrial size. Right atrial size is normal.  Mitral Valve Mitral valve leaflets appear normal. There is no evidence of mitral stenosis or clinically significant mitral regurgitation.  Tricuspid Valve Tricuspid valve leaflets appear normal. Right ventricle systolic pressure estimate normal. There is trace to mild tricuspid regurgitation.  Aortic Valve Aortic valve leaflets appear normal. There is no evidence of aortic stenosis or clinically significant aortic regurgitation.  Pulmonic Valve The pulmonic valve is not well seen, but is grossly normal. There is trace pulmonic valvular regurgitation.  Vessels Mildly dilated aortic root. The ascending aorta is Borderline dilated. IVC diameter <2.1 cm collapsing >50% with sniff suggests a normal RA pressure of 3 mmHg.  Pericardium There is no pericardial effusion.  ______________________________________________________________________________ MMode/2D Measurements & Calculations RVDd: 3.7 cm IVSd: 1.2 cm LVIDd: 4.7 cm LVIDs: 3.0 cm LVPWd: 1.1 cm FS: 37.6 %  LV mass(C)d: 202.3 grams LV mass(C)dI: 101.8 grams/m2 Ao root diam: 3.9 cm LA dimension: 3.3 cm asc Aorta Diam: 3.7 cm LA/Ao: 0.85 LVOT diam: 2.6 cm LVOT area: 5.3 cm2 LA Volume Indexed (AL/bp): 27.6 ml/m2 RWT: 0.48  Doppler Measurements & Calculations MV E max silvino: 53.3 cm/sec MV A max silvino: 45.3 cm/sec MV E/A: 1.2 MV max P.0 mmHg MV mean P.84 mmHg MV V2 VTI: 27.6 cm MVA(VTI): 3.9 cm2  MV dec slope: 131.5 cm/sec2 MV dec time: 0.40 sec Ao V2 max: 127.7 cm/sec Ao max P.0 mmHg Ao V2 mean: 78.1 cm/sec Ao mean PG: 3.1 mmHg Ao V2 VTI: 28.9 cm ARBEN(I,D): 3.7 cm2 ARBEN(V,D): 4.0 cm2 LV V1 max PG: 3.8 mmHg LV V1 max: 97.3 cm/sec LV V1 VTI: 20.4 cm SV(LVOT): 107.6 ml SI(LVOT): 54.1 ml/m2 PA acc time: 0.13 sec TR max silvino: 223.2 cm/sec TR max P.9 mmHg AV Silvino Ratio (DI):  0.76 ARBEN Index (cm2/m2): 1.9 E/E' av.5 Lateral E/e': 5.0 Medial E/e': 6.0  ______________________________________________________________________________ Report approved by: Maurisio Milan 10/26/2021 10:43 AM       US Lower Extremity Venous Duplex Bilateral    Result Date: 10/26/2021  EXAM: ULTRASOUND VENOUS BILATERAL LOWER EXTREMITIES WITH DOPPLER LOCATION: [facility] DATE/TIME: 10/25/2021 11:36 PM INDICATION: Asymmetrical leg edema, left side more so than right. COMPARISON: None. TECHNIQUE: Venous Duplex ultrasound of bilateral lower extremities with and without compression, augmentation and duplex. Color flow and spectral Doppler with waveform analysis performed. FINDINGS: Exam includes the common femoral, femoral, popliteal veins as well as segmentally visualized deep calf veins and greater saphenous vein. RIGHT: Normal compressibility of the common femoral, femoral, popliteal, posterior tibial, peroneal and great saphenous veins. Unremarkable Doppler waveform evaluation of the common femoral, femoral and popliteal veins. LEFT: Normal compressibility of the common femoral, femoral, popliteal, posterior tibial, peroneal and great saphenous veins. Unremarkable Doppler waveform evaluation of the common femoral, femoral and popliteal veins.     IMPRESSION: No evidence of thrombus in the major veins of bilateral lower extremities.     US Renal Complete    Result Date: 10/26/2021  EXAM: US RENAL COMPLETE LOCATION: [facility] DATE/TIME: 10/25/2021 11:36 PM INDICATION: Urinary retention. COMPARISON: CT 10/25/2021 TECHNIQUE: Routine Bilateral Renal and Bladder Ultrasound. FINDINGS: RIGHT KIDNEY: 11.8 x 5.6 x 6.5 cm. Severe right-sided hydronephrosis essentially unchanged since the CT of 10/25/2021. Normal renal cortical thickness and echogenicity. No evidence for echogenic intrarenal calculi or exophytic cystic lesion. LEFT KIDNEY: 11.1 x 6.5 x 6.3 cm.  Moderate left-sided hydronephrosis essentially unchanged since the CT of 10/25/2021. Normal renal cortical echogenicity and thickening. No evidence for echogenic intrarenal calculi or exophytic cystic lesions. BLADDER: The bladder is decompressed by Costa catheter. Despite this, there still appears to be significant bladder wall thickening at 1.6 cm. Markedly enlarged prostate measuring 6.8 x 7.3 x 6.3 cm.     IMPRESSION: 1.  Despite drainage of the bladder, there still remains severe right-sided hydronephrosis and moderate left-sided hydronephrosis. This is essentially unchanged since CT of 10/25/2021. Bladder decompressed by Costa catheter. Allowing for decompression, there still remains significant bladder wall thickening. 2.  Marked prostate enlargement as detailed above.      PENDING LABS         PROCEDURES ( this hospitalization only)          RECOMMENDATIONS TO OUTPATIENT PROVIDER FOR F/U VISIT     Follow-up with [facility] in 1 to 2 weeks    DISPOSITION     Home    SUMMARY OF HOSPITAL COURSE:      Mono Galaviz is a 71 year old male with PMH of HLD, nephrolithiasis,  ~ 5 months history of progressive abdominal discomfort and distention, increasing urinary frequency and urgency.   CT abdomen at [facility] (report is not available) demonstrated distended bladder.  Costa catheter was placed with immediate return of 2.7 L of yellow urine with subsequent hematuria.       1.  Urinary retention with bilateral hydronephrosis.    Onset of symptoms ~ 5 months ago, with associated unintentional weight loss, fatigue concerning for  malignancy.  PSA is pending.  S/p Costa placement in ER, 2.7 L of yellow urine evacuated.    Urology consult appreciated.  Renal ultrasound 10/26 demonstrats persistent severe bilateral hydronephrosis.  Repeat imaging today shows slight improvement.    No hypotension overnight   2.  Gross hematuria.  Likely due to traumatic Costa catheter placement.  Urology recommended  manual irrigation of catheter as needed for patency hemoglobin stable 3.  UTI.  Urine culture showed no growth and therefore was taken off IV Rocephin    4.  Postobstructive acute renal failure  secondary to obstruction.  Improving creatinine after insertion of Costa's catheter  .    Accelerated BP without previous history of hypertension.  Pain and IV fluids was likely contributing, h BP is improving after bladder decompression.  DC without BP medication and follow-up with PCP.    6.  Bilateral LE edema, suspect dependent edema given urinary retention.  No evidence of DVT.  BNP is slightly elevated but echocardiogram is fairly unremarkable.    Discharge Medications with Med changes:        Review of your medicines      START taking      Dose / Directions   tamsulosin 0.4 MG capsule  Commonly known as: FLOMAX      Dose: 0.4 mg  Take 1 capsule (0.4 mg) by mouth every evening  Quantity: 30 capsule  Refills: 1           Where to get your medicines      These medications were sent to [id number] - [place], [place] - [address] AT [address] [facility] & [address] [address]    Phone: [phone]     tamsulosin 0.4 MG capsule             Rationale for medication changes:      Flomax for BPH        Consults   Urology      Immunizations given this encounter     Immunization History   Administered Date(s) Administered     COVID-19,PF,Pfizer (12+ Yrs) 01/26/2021, 02/16/2021, 10/02/2021     Influenza (H1N1) 01/20/2010     Influenza (High Dose) 3 valent vaccine 10/28/2019     Influenza Vaccine IM > 6 months Valent IIV4 (Alfuria,Fluzone) 09/14/2015     Pneumo Conj 13-V (2010&after) 10/28/2019     Pneumococcal 23 valent 09/14/2015     Tdap (Adacel,Boostrix) 05/25/2010, 09/14/2015          Anticoagulation Information      Recent INR results: No lab results found.  Warfarin doses (if applicable) or name of other anticoagulant: Not applicable      Discharge Orders     Discharge  "Procedure Orders   Follow-up and recommended labs and tests    Order Comments: Follow up with primary care provider, Dru Lopez, within 7 days for hospital follow- up.  No follow up labs or test are needed.    Follow-up with [facility] in 1 to 2 weeks     Activity   Order Comments: Your activity upon discharge: activity as tolerated     Order Specific Question Answer Comments   Is discharge order? Yes      Reason for your hospital stay   Order Comments: Urinary retention     Diet   Order Comments: Follow this diet upon discharge: Regular     Order Specific Question Answer Comments   Is discharge order? Yes      Examination     Vital Signs in last 24 hours:   Vital signs:  Temp: 97.9  F (36.6  C) Temp src: Oral BP: 131/67 Pulse: 68   Resp: 16 SpO2: 98 % O2 Device: None (Room air)     Weight: 78.9 kg (174 lb)  Estimated body mass index is 23.93 kg/m  as calculated from the following:    Height as of 6/14/21: 1.816 m (5' 11.5\").    Weight as of this encounter: 78.9 kg (174 lb).        General appearance: alert, appears stated age, cooperative and Pink-colored urine in the urine bag       Please see EMR for more detailed significant labs, imaging, consultant notes etc.  Total time spent on discharge: 35 minutes    Charles Littlejohn MD   [facility] Service: Ph:[phone]    CC:Dru Lopez    "

## 2021-10-28 NOTE — PROGRESS NOTES
Pt discharging to home. Discharge instructions given to and reviewed with patient and patients wife at bedside. Costa catheter care reviewed with patient and wife including leg bag teaching. Pt and wife verbalize no questions or concerns.

## 2021-10-28 NOTE — PROGRESS NOTES
Place of Service:  [facility]     Reason for follow up: Bladder outlet obstruction likely 2/2 enlarged prostate, with bilateral hydronephrosis, JACQUES and hematuria following navarro placement    SUBJECTIVE:  Events: No acute events.     Urine now translucent peach with red sediment at rest. Pt reports becomes more red but remains translucent when ambulating. No clots noted and navarro has remained patent. Denies abdominal and bilateral flank pain, fever, chills.     OBJECTIVE:  PHYSICAL EXAM:  Temp: 97.9  F (36.6  C) Temp src: Oral BP: 131/67 Pulse: 68   Resp: 16 SpO2: 98 % O2 Device: None (Room air)    General: NAD, alert, cooperative  Head: normocephalic, without abnormality / atraumatic  Abdomen: soft, non- tender,  Non- distended. No suprapubic/tenderness noted. No bilateral CVA tenderness noted.  Geniturinary/Rectal: Penis and scrotum without erythema or edema. 16 Fr navarro draining translucent peach colored urine, no clots or debris noted.  Skin: no rashes or lesions over abdomen/groin.   Musculoskeletal: moves all extremities equally  Psychological: alert and oriented, answers questions appropriately.    LABS:  Creatinine   Date Value Ref Range Status   10/28/2021 1.68 (H) 0.70 - 1.30 mg/dL Final     WBC Count   Date Value Ref Range Status   10/25/2021 5.4 4.0 - 11.0 10e3/uL Final   10/25/2021 5.4 4.0 - 11.0 10e3/uL Final     Hemoglobin   Date Value Ref Range Status   10/28/2021 10.4 (L) 13.3 - 17.7 g/dL Final     Platelet Count   Date Value Ref Range Status   10/25/2021 149 (L) 150 - 450 10e3/uL Final     Creatinine   Date Value Ref Range Status   10/28/2021 1.68 (H) 0.70 - 1.30 mg/dL Final     UA:  UA RESULTS:  Recent Labs   Lab Test 10/25/21  1807   COLOR Colorless   APPEARANCE Clear   URINEGLC Negative   URINEBILI Negative   URINEKETONE Negative   SG 1.009   UBLD 0.06 mg/dL*   URINEPH 6.5   PROTEIN Negative   NITRITE Negative   LEUKEST 75 Sandee/uL*   RBCU 9*   WBCU 16*     Cultures:  Urine 10/25: No  growth     Lab Results: personally reviewed.       ASSESSMENT/PLAN:  Mono Galaviz is being seen by [facility] for bladder outlet obstruction likely 2/2 enlarged prostate, with 2.7 liter retention, bilateral hydronephrosis, JACQUES and hematuria following navarro placement    - UC 10/25: no growth. Remains afebrile and WBC WNL 10/25. Discontinued antibiotic.   - Urine appears to be clearing. Hgb stable, 10.4 today.  - Creatinine continuing to improve, 1.68 today. Avoid nephrotoxins. Monitor.    - Stable moderate left hydro, improving right hydro and marked bladder wall thickening on repeat US today. In absence of obvious infection, without flank/abdominal pain and with improving creatinine, will hold on left ureteral stent placement or PNT and continue to monitor closely.   - Continue Flomax 0.4 mg po daily.   - PSA 10/25 - 6.4; PSA 9/14 - 3.2.   - Ok from Urology standpoint for discharge with navarro catheter. RNs to teach navarro cares. Pt will call/seek urgent medical assistance if he develops any S&S infection, flank/abdominal pain, decreased urine flow/catheter malfunction.  -  Email sent to schedulers to arrange trial void and clinic follow up with surgeon in 1-2 weeks, to include creatinine check and may include urine cytology and cystoscopy. Contact info placed in AVS.     Will update: Dr. eRgla Salcido, APRN, CNP  [facility]   [phone]

## 2021-10-28 NOTE — PLAN OF CARE
Problem: Urinary Retention  Goal: Effective Urinary Elimination  Outcome: Improving   Indwelling navarro is patent of light red o/p, no clots noted . Adequate o/p; strict I&O.    Problem: UTI (Urinary Tract Infection)  Goal: Improved Infection Symptoms  Outcome: Improving   Pt has been afebrile, denies any s/sx. Received rocephin, has IVF at 125 ml/hr.   Denies any pain or discomfort.

## 2021-11-08 ENCOUNTER — TRANSFERRED RECORDS (OUTPATIENT)
Dept: HEALTH INFORMATION MANAGEMENT | Facility: CLINIC | Age: 71
End: 2021-11-08
Payer: MEDICARE

## 2021-11-12 ENCOUNTER — OFFICE VISIT (OUTPATIENT)
Dept: FAMILY MEDICINE | Facility: CLINIC | Age: 71
End: 2021-11-12
Payer: MEDICARE

## 2021-11-12 VITALS
RESPIRATION RATE: 12 BRPM | DIASTOLIC BLOOD PRESSURE: 72 MMHG | HEART RATE: 68 BPM | BODY MASS INDEX: 22.54 KG/M2 | WEIGHT: 166.4 LBS | SYSTOLIC BLOOD PRESSURE: 134 MMHG | TEMPERATURE: 98.3 F | HEIGHT: 72 IN

## 2021-11-12 DIAGNOSIS — R31.0 GROSS HEMATURIA: ICD-10-CM

## 2021-11-12 DIAGNOSIS — D64.89 ANEMIA DUE TO OTHER CAUSE, NOT CLASSIFIED: ICD-10-CM

## 2021-11-12 DIAGNOSIS — Z09 HOSPITAL DISCHARGE FOLLOW-UP: Primary | ICD-10-CM

## 2021-11-12 DIAGNOSIS — R33.9 URINARY RETENTION: ICD-10-CM

## 2021-11-12 DIAGNOSIS — R03.0 ELEVATED BLOOD PRESSURE READING WITHOUT DIAGNOSIS OF HYPERTENSION: ICD-10-CM

## 2021-11-12 DIAGNOSIS — N17.9 ACUTE RENAL FAILURE, UNSPECIFIED ACUTE RENAL FAILURE TYPE (H): ICD-10-CM

## 2021-11-12 PROBLEM — D12.3 BENIGN NEOPLASM OF TRANSVERSE COLON: Status: ACTIVE | Noted: 2021-10-13

## 2021-11-12 LAB
ANION GAP SERPL CALCULATED.3IONS-SCNC: 9 MMOL/L (ref 5–18)
BUN SERPL-MCNC: 18 MG/DL (ref 8–28)
CALCIUM SERPL-MCNC: 9.4 MG/DL (ref 8.5–10.5)
CHLORIDE BLD-SCNC: 105 MMOL/L (ref 98–107)
CO2 SERPL-SCNC: 25 MMOL/L (ref 22–31)
CREAT SERPL-MCNC: 1.57 MG/DL (ref 0.7–1.3)
ERYTHROCYTE [DISTWIDTH] IN BLOOD BY AUTOMATED COUNT: 13.5 % (ref 10–15)
GFR SERPL CREATININE-BSD FRML MDRD: 44 ML/MIN/1.73M2
GLUCOSE BLD-MCNC: 96 MG/DL (ref 70–125)
HCT VFR BLD AUTO: 33.1 % (ref 40–53)
HGB BLD-MCNC: 10.9 G/DL (ref 13.3–17.7)
MCH RBC QN AUTO: 31.8 PG (ref 26.5–33)
MCHC RBC AUTO-ENTMCNC: 32.9 G/DL (ref 31.5–36.5)
MCV RBC AUTO: 97 FL (ref 78–100)
PLATELET # BLD AUTO: 153 10E3/UL (ref 150–450)
POTASSIUM BLD-SCNC: 4.5 MMOL/L (ref 3.5–5)
RBC # BLD AUTO: 3.43 10E6/UL (ref 4.4–5.9)
SODIUM SERPL-SCNC: 139 MMOL/L (ref 136–145)
WBC # BLD AUTO: 7.4 10E3/UL (ref 4–11)

## 2021-11-12 PROCEDURE — 99214 OFFICE O/P EST MOD 30 MIN: CPT | Performed by: FAMILY MEDICINE

## 2021-11-12 PROCEDURE — 85027 COMPLETE CBC AUTOMATED: CPT | Performed by: FAMILY MEDICINE

## 2021-11-12 PROCEDURE — 36415 COLL VENOUS BLD VENIPUNCTURE: CPT | Performed by: FAMILY MEDICINE

## 2021-11-12 PROCEDURE — 80048 BASIC METABOLIC PNL TOTAL CA: CPT | Performed by: FAMILY MEDICINE

## 2021-11-12 RX ORDER — TAMSULOSIN HYDROCHLORIDE 0.4 MG/1
0.8 CAPSULE ORAL EVERY EVENING
Qty: 60 CAPSULE | Refills: 1 | Status: SHIPPED | OUTPATIENT
Start: 2021-11-12 | End: 2022-01-10

## 2021-11-12 ASSESSMENT — MIFFLIN-ST. JEOR: SCORE: 1547.79

## 2021-11-12 NOTE — ASSESSMENT & PLAN NOTE
Hospital follow-up.  Hospitalization due to urinary retention over the summer months.  Now straight catheterizing.  He overall feels better but his bladder function has not improved.  He is working with urology to determine whether or not this is related purely to prostatic hypertrophy or prostate cancer.  The initial impression is that it is the former.  JACQUES and anemia seen on laboratory testing during hospitalization.  We will recheck those today.  He has been taking his medication without difficulty.  We will increase Flomax to 0.8 mg nightly to see if he can get some increased benefit.  He will continue to follow with his urologist, Dr. Arauz.  If he is still anemic, we will plan on rechecking in a month to see if there is overall improvement.  No active blood loss.  Recent colonoscopy without abnormalities.  Normal nutrition.

## 2021-11-12 NOTE — PROGRESS NOTES
Assessment/Plan:    ARF (acute renal failure) (H)  Anticipate improvement.  Check basic metabolic panel.    Urinary retention  Hospital follow-up.  Hospitalization due to urinary retention over the summer months.  Now straight catheterizing.  He overall feels better but his bladder function has not improved.  He is working with urology to determine whether or not this is related purely to prostatic hypertrophy or prostate cancer.  The initial impression is that it is the former.  JACQUES and anemia seen on laboratory testing during hospitalization.  We will recheck those today.  He has been taking his medication without difficulty.  We will increase Flomax to 0.8 mg nightly to see if he can get some increased benefit.  He will continue to follow with his urologist, Dr. Arauz.  If he is still anemic, we will plan on rechecking in a month to see if there is overall improvement.  No active blood loss.  Recent colonoscopy without abnormalities.  Normal nutrition.    32 minutes spent on the date of the encounter doing chart review, history and exam, documentation and further activities per the note    Return in about 4 weeks (around 12/10/2021) for Recheck if not improving.    Dru Lopez MD  _______________________________    Chief Complaint   Patient presents with     Hospital F/U     Subjective: Mono Galaviz is a 71 year old year old male who returns to clinic for the following chronic complaints/concerns:     \[facility]\""      Hospital Follow-up Visit:    Hospital/Nursing Home/IP Rehab Facility: [facility]  Date of Admission: 10/25/2021  Date of Discharge: 10/28/2021  Reason(s) for Admission: urinary retention      Was your hospitalization related to COVID-19? No   Problems taking medications regularly:  None  Medication changes since discharge: None  Problems adhering to non-medication therapy:  None    Narative history:   - still undergoing work-up for prostate cancer.  PSA tests have  "been elevated.  \"not a clear answer.\" Seeing Dr. Arauz. Swelling in abdommen and bulge this past summer.  Decreased energy.  Discomfort.   - obstructive symptoms started in late September.     - hematuria with clots that have resolved.  Self-catherter/     Summary of hospitalization:  [facility] discharge summary reviewed  Diagnostic Tests/Treatments reviewed.  Follow up needed: none  Other Healthcare Providers Involved in Patient s Care:         Dr. Arauz.   Update since discharge: stable.       Post Discharge Medication Reconciliation: discharge medications reconciled, continue medications without change.  Plan of care communicated with patient                Review of Systems   Constitutional:        Review of systems negative except as noted in the HPI.   All other systems reviewed and are negative.       Reviewed history: Tobacco  Allergies  Meds  Problems  Med Hx  Surg Hx  Fam Hx         Objective:    height is 1.829 m (6') and weight is 75.5 kg (166 lb 6.4 oz). His oral temperature is 98.3  F (36.8  C). His blood pressure is 134/72 and his pulse is 68. His respiration is 12.   Physical Exam  Constitutional:       General: He is not in acute distress.     Appearance: Normal appearance.   HENT:      Head: Normocephalic and atraumatic.      Right Ear: External ear normal.      Left Ear: External ear normal.   Eyes:      General: No scleral icterus.     Conjunctiva/sclera: Conjunctivae normal.   Cardiovascular:      Rate and Rhythm: Normal rate and regular rhythm.      Heart sounds: Normal heart sounds. No murmur heard.  No friction rub. No gallop.    Pulmonary:      Effort: Pulmonary effort is normal. No respiratory distress.      Breath sounds: Normal breath sounds. No wheezing or rales.   Abdominal:      General: There is no distension.      Palpations: Abdomen is soft.      Tenderness: There is abdominal tenderness. There is no guarding.   Musculoskeletal:         General: No " swelling. Normal range of motion.   Skin:     General: Skin is warm.      Coloration: Skin is not jaundiced.      Findings: No rash.   Neurological:      General: No focal deficit present.      Mental Status: He is alert. Mental status is at baseline.   Psychiatric:         Mood and Affect: Mood normal.       Ultrasound results from hospital reviewed.  Basic metabolic panel and CBC reviewed from hospitalization.  Discharge notes reviewed from hospitalization in great detail.    No flowsheet data found.  No flowsheet data found.  No flowsheet data found.  No flowsheet data found.  Recent Results (from the past 48 hour(s))   CBC with platelets    Collection Time: 11/12/21 11:42 AM   Result Value Ref Range    WBC Count 7.4 4.0 - 11.0 10e3/uL    RBC Count 3.43 (L) 4.40 - 5.90 10e6/uL    Hemoglobin 10.9 (L) 13.3 - 17.7 g/dL    Hematocrit 33.1 (L) 40.0 - 53.0 %    MCV 97 78 - 100 fL    MCH 31.8 26.5 - 33.0 pg    MCHC 32.9 31.5 - 36.5 g/dL    RDW 13.5 10.0 - 15.0 %    Platelet Count 153 150 - 450 10e3/uL     No results found for this visit on 11/12/21.    This note has been dictated using voice recognition software. Any grammatical or context distortions are unintentional and inherent to the software

## 2021-12-10 ENCOUNTER — TRANSFERRED RECORDS (OUTPATIENT)
Dept: HEALTH INFORMATION MANAGEMENT | Facility: CLINIC | Age: 71
End: 2021-12-10
Payer: MEDICARE

## 2021-12-27 ENCOUNTER — IMMUNIZATION (OUTPATIENT)
Dept: FAMILY MEDICINE | Facility: CLINIC | Age: 71
End: 2021-12-27
Payer: MEDICARE

## 2021-12-27 PROCEDURE — G0008 ADMIN INFLUENZA VIRUS VAC: HCPCS

## 2021-12-27 PROCEDURE — 90662 IIV NO PRSV INCREASED AG IM: CPT

## 2021-12-29 ENCOUNTER — TRANSFERRED RECORDS (OUTPATIENT)
Dept: HEALTH INFORMATION MANAGEMENT | Facility: CLINIC | Age: 71
End: 2021-12-29
Payer: MEDICARE

## 2022-01-07 DIAGNOSIS — R33.9 URINARY RETENTION: ICD-10-CM

## 2022-01-10 RX ORDER — TAMSULOSIN HYDROCHLORIDE 0.4 MG/1
0.8 CAPSULE ORAL EVERY EVENING
Qty: 60 CAPSULE | Refills: 1 | Status: SHIPPED | OUTPATIENT
Start: 2022-01-10 | End: 2022-04-24

## 2022-01-10 NOTE — TELEPHONE ENCOUNTER
"Routing refill request to provider for review/approval because:  PCP to review    Last Written Prescription Date:  11/12/2021  Last Fill Quantity: 60,  # refills: 1   Last office visit provider:  11/12/2021 Dr. Padilla     Requested Prescriptions   Pending Prescriptions Disp Refills     tamsulosin (FLOMAX) 0.4 MG capsule [Pharmacy Med Name: TAMSULOSIN HCL 0.4 MG CAPSULE] 60 capsule 1     Sig: TAKE 2 CAPSULES (0.8 MG) BY MOUTH EVERY EVENING       Alpha Blockers Passed - 1/7/2022 12:20 AM        Passed - Blood pressure under 140/90 in past 12 months     BP Readings from Last 3 Encounters:   11/12/21 134/72   10/28/21 131/67   06/14/21 116/62                 Passed - Recent (12 mo) or future (30 days) visit within the authorizing provider's specialty     Patient has had an office visit with the authorizing provider or a provider within the authorizing providers department within the previous 12 mos or has a future within next 30 days. See \"Patient Info\" tab in inbasket, or \"Choose Columns\" in Meds & Orders section of the refill encounter.              Passed - Patient does not have Tadalafil, Vardenafil, or Sildenafil on their medication list        Passed - Medication is active on med list        Passed - Patient is 18 years of age or older             Arely Guy RN 01/09/22 10:58 PM  "

## 2022-02-09 ENCOUNTER — TRANSFERRED RECORDS (OUTPATIENT)
Dept: HEALTH INFORMATION MANAGEMENT | Facility: CLINIC | Age: 72
End: 2022-02-09
Payer: MEDICARE

## 2022-04-24 ENCOUNTER — VIRTUAL VISIT (OUTPATIENT)
Dept: URGENT CARE | Facility: CLINIC | Age: 72
End: 2022-04-24
Payer: MEDICARE

## 2022-04-24 DIAGNOSIS — U07.1 CLINICAL DIAGNOSIS OF COVID-19: Primary | ICD-10-CM

## 2022-04-24 PROCEDURE — 99214 OFFICE O/P EST MOD 30 MIN: CPT | Mod: 95

## 2022-04-24 NOTE — PROGRESS NOTES
Mono is a 71 year old who is being evaluated via a billable video visit.        Video Start Time: 6:00 PM    Assessment & Plan     Clinical diagnosis of COVID-19    Treat as below.    - nirmatrelvir and ritonavir (PAXLOVID) therapy pack; Take 2 tablets by mouth 2 times daily for 5 days Take 1 tablet of Nirmatelvir and 1 tablet of Ritonavir twice daily for 5 days        {Provider  Link to [facility] Help Grid :796344}     COVID-19 positive patient.  Encounter for consideration of medication intervention. Patient does qualify for a prescription. Full discussion with patient including medication options, risks and benefits. Potential drug interactions reviewed with patient.   Treatment Planned Paxlovid at decreased dosing due to renal insufficiency, Rx sent to [facility]  [facility]   [phone]    [address] [place] [id number]    Hours:  Mon-Fri: 8:30a - 5:00p  Sat-Sun: 9:00a - 1:00p    Drive-thru available   Temporary change to home medications:  None     Estimated body mass index is 22.57 kg/m  as calculated from the following:    Height as of 11/12/21: 1.829 m (6').    Weight as of 11/12/21: 75.5 kg (166 lb 6.4 oz).  GFR Estimate   Date Value Ref Range Status   11/12/2021 44 (L) >60 mL/min/1.73m2 Final     Comment:     As of July 11, 2021, eGFR is calculated by the CKD-EPI creatinine equation, without race adjustment. eGFR can be influenced by muscle mass, exercise, and diet. The reported eGFR is an estimation only and is only applicable if the renal function is stable.   06/14/2021 >60 >60 mL/min/1.73m2 Final     Lab Results   Component Value Date    [id number] Negative 10/25/2021       No follow-ups on file.    [facility]  [facility]    Subjective   Mono is a 71 year old who presents for the following health issues     HPI       COVID-19 Symptom Review  How many days ago did these symptoms start? 4/23/22    Are any of the following symptoms  significant for you?    New or worsening difficulty breathing? No    Worsening cough? Yes, I am coughing up mucus.    Fever or chills? Yes, I felt feverish or had chills.    Headache: YES    Sore throat: no    Chest pain: no    Diarrhea: no    Body aches? YES- fatigue    What treatments has patient tried? none   Does patient live in a nursing home, group home, or shelter? no  Does patient have a way to get food/medications during quarantined? Yes, I have a friend or family member who can help me.              Review of Systems   Constitutional, HEENT, cardiovascular, pulmonary, gi and gu systems are negative, except as otherwise noted.      Objective           Vitals:  No vitals were obtained today due to virtual visit.    Physical Exam   GENERAL: Healthy, alert and no distress  EYES: Eyes grossly normal to inspection.  No discharge or erythema, or obvious scleral/conjunctival abnormalities.  HENT: Normal cephalic/atraumatic.  External ears, nose and mouth without ulcers or lesions.  No nasal drainage visible.  NECK: No asymmetry, visible masses or scars  RESP: No audible wheeze, cough, or visible cyanosis.  No visible retractions or increased work of breathing.    SKIN: Visible skin clear. No significant rash, abnormal pigmentation or lesions.  NEURO: Cranial nerves grossly intact.  Mentation and speech appropriate for age.  PSYCH: Mentation appears normal, affect normal/bright, judgement and insight intact, normal speech and appearance well-groomed.                Video-Visit Details    Type of service:  Video Visit    Video End Time:6:12 PM    Originating Location (pt. Location): Home    Distant Location (provider location):   [email or web address]     Platform used for Video Visit: SumUp

## 2022-06-08 ENCOUNTER — APPOINTMENT (OUTPATIENT)
Dept: URBAN - METROPOLITAN AREA CLINIC 260 | Age: 72
Setting detail: DERMATOLOGY
End: 2022-06-11

## 2022-06-08 VITALS — WEIGHT: 169 LBS | HEIGHT: 72 IN

## 2022-06-08 DIAGNOSIS — D18.0 HEMANGIOMA: ICD-10-CM

## 2022-06-08 DIAGNOSIS — L82.1 OTHER SEBORRHEIC KERATOSIS: ICD-10-CM

## 2022-06-08 DIAGNOSIS — Z87.2 PERSONAL HISTORY OF DISEASES OF THE SKIN AND SUBCUTANEOUS TISSUE: ICD-10-CM

## 2022-06-08 DIAGNOSIS — D22 MELANOCYTIC NEVI: ICD-10-CM

## 2022-06-08 DIAGNOSIS — Z71.89 OTHER SPECIFIED COUNSELING: ICD-10-CM

## 2022-06-08 DIAGNOSIS — D49.2 NEOPLASM OF UNSPECIFIED BEHAVIOR OF BONE, SOFT TISSUE, AND SKIN: ICD-10-CM

## 2022-06-08 PROBLEM — D18.01 HEMANGIOMA OF SKIN AND SUBCUTANEOUS TISSUE: Status: ACTIVE | Noted: 2022-06-08

## 2022-06-08 PROBLEM — D22.5 MELANOCYTIC NEVI OF TRUNK: Status: ACTIVE | Noted: 2022-06-08

## 2022-06-08 PROCEDURE — OTHER MIPS QUALITY: OTHER

## 2022-06-08 PROCEDURE — 11102 TANGNTL BX SKIN SINGLE LES: CPT

## 2022-06-08 PROCEDURE — 11103 TANGNTL BX SKIN EA SEP/ADDL: CPT

## 2022-06-08 PROCEDURE — OTHER BIOPSY BY SHAVE METHOD: OTHER

## 2022-06-08 PROCEDURE — OTHER COUNSELING: OTHER

## 2022-06-08 PROCEDURE — OTHER EDUCATIONAL RESOURCES PROVIDED: OTHER

## 2022-06-08 PROCEDURE — 99203 OFFICE O/P NEW LOW 30 MIN: CPT | Mod: 25

## 2022-06-08 ASSESSMENT — LOCATION SIMPLE DESCRIPTION DERM
LOCATION SIMPLE: UPPER BACK
LOCATION SIMPLE: RIGHT UPPER ARM
LOCATION SIMPLE: RIGHT THUMB
LOCATION SIMPLE: ABDOMEN
LOCATION SIMPLE: CHEST
LOCATION SIMPLE: SCALP

## 2022-06-08 ASSESSMENT — LOCATION ZONE DERM
LOCATION ZONE: FINGER
LOCATION ZONE: ARM
LOCATION ZONE: TRUNK
LOCATION ZONE: SCALP

## 2022-06-08 ASSESSMENT — LOCATION DETAILED DESCRIPTION DERM
LOCATION DETAILED: 1ST WEB SPACE RIGHT HAND
LOCATION DETAILED: RIGHT CENTRAL FRONTAL SCALP
LOCATION DETAILED: INFERIOR THORACIC SPINE
LOCATION DETAILED: RIGHT ANTERIOR PROXIMAL UPPER ARM
LOCATION DETAILED: LEFT LATERAL SUPERIOR CHEST
LOCATION DETAILED: EPIGASTRIC SKIN

## 2022-06-08 NOTE — PROCEDURE: BIOPSY BY SHAVE METHOD
Anesthesia Type: 1% lidocaine with epinephrine
Consent: Written consent was obtained and risks were reviewed including but not limited to scarring, infection, bleeding, scabbing, incomplete removal, nerve damage and allergy to anesthesia.
Cryotherapy Text: The wound bed was treated with cryotherapy after the biopsy was performed.
Destruction After The Procedure: No
Dressing: bandage
Anesthesia Volume In Cc (Will Not Render If 0): 0.3
Notification Instructions: Patient will be notified of biopsy results. However, patient instructed to call the office if not contacted within 2 weeks.
Type Of Destruction Used: Curettage
Post-Care Instructions: I reviewed with the patient in detail post-care instructions. Patient is to keep the biopsy site dry overnight, and then apply vaseline and clean bandage daily.
Was A Bandage Applied: Yes
Hemostasis: Drysol
Billing Type: Third-Party Bill
Silver Nitrate Text: The wound bed was treated with silver nitrate after the biopsy was performed.
Wound Care: Petrolatum
X Size Of Lesion In Cm: 0
Path Notes (To The Dermatopathologist): Please confirm margins
Biopsy Type: H and E
Depth Of Biopsy: dermis
Electrodesiccation Text: The wound bed was treated with electrodesiccation after the biopsy was performed.
Biopsy Method: Dermablade
Detail Level: Detailed
Curettage Text: The wound bed was treated with curettage after the biopsy was performed.
Information: Selecting Yes will display possible errors in your note based on the variables you have selected. This validation is only offered as a suggestion for you. PLEASE NOTE THAT THE VALIDATION TEXT WILL BE REMOVED WHEN YOU FINALIZE YOUR NOTE. IF YOU WANT TO FAX A PRELIMINARY NOTE YOU WILL NEED TO TOGGLE THIS TO 'NO' IF YOU DO NOT WANT IT IN YOUR FAXED NOTE.
Electrodesiccation And Curettage Text: The wound bed was treated with electrodesiccation and curettage after the biopsy was performed.

## 2022-06-08 NOTE — PROCEDURE: MIPS QUALITY
Quality 110: Preventive Care And Screening: Influenza Immunization: Influenza Immunization Ordered or Recommended, but not Administered due to system reason
Quality 226: Preventive Care And Screening: Tobacco Use: Screening And Cessation Intervention: Patient screened for tobacco use and is an ex/non-smoker
Quality 431: Preventive Care And Screening: Unhealthy Alcohol Use - Screening: Patient not identified as an unhealthy alcohol user when screened for unhealthy alcohol use using a systematic screening method
Quality 111:Pneumonia Vaccination Status For Older Adults: Pneumococcal vaccine administered on or after patient’s 60th birthday and before the end of the measurement period
Detail Level: Detailed
Quality 130: Documentation Of Current Medications In The Medical Record: Current Medications Documented

## 2022-06-24 ENCOUNTER — APPOINTMENT (OUTPATIENT)
Dept: URBAN - METROPOLITAN AREA CLINIC 260 | Age: 72
Setting detail: DERMATOLOGY
End: 2022-06-24

## 2022-07-17 ENCOUNTER — HEALTH MAINTENANCE LETTER (OUTPATIENT)
Age: 72
End: 2022-07-17

## 2022-08-09 ENCOUNTER — TRANSFERRED RECORDS (OUTPATIENT)
Dept: HEALTH INFORMATION MANAGEMENT | Facility: CLINIC | Age: 72
End: 2022-08-09

## 2022-09-25 ENCOUNTER — HEALTH MAINTENANCE LETTER (OUTPATIENT)
Age: 72
End: 2022-09-25

## 2022-12-05 ASSESSMENT — ENCOUNTER SYMPTOMS
COUGH: 0
DYSURIA: 0
MYALGIAS: 0
NERVOUS/ANXIOUS: 0
PARESTHESIAS: 0
DIZZINESS: 0
FEVER: 0
HEMATOCHEZIA: 0
SORE THROAT: 0
HEARTBURN: 0
HEADACHES: 0
PALPITATIONS: 0
ABDOMINAL PAIN: 1
WEAKNESS: 0
DIARRHEA: 0
EYE PAIN: 0
CHILLS: 0
HEMATURIA: 0
NAUSEA: 0
JOINT SWELLING: 0
ARTHRALGIAS: 0
FREQUENCY: 0
SHORTNESS OF BREATH: 0
CONSTIPATION: 0

## 2022-12-05 ASSESSMENT — ACTIVITIES OF DAILY LIVING (ADL): CURRENT_FUNCTION: NO ASSISTANCE NEEDED

## 2022-12-12 ENCOUNTER — OFFICE VISIT (OUTPATIENT)
Dept: FAMILY MEDICINE | Facility: CLINIC | Age: 72
End: 2022-12-12
Payer: MEDICARE

## 2022-12-12 VITALS
HEART RATE: 60 BPM | HEIGHT: 71 IN | BODY MASS INDEX: 25.34 KG/M2 | OXYGEN SATURATION: 97 % | SYSTOLIC BLOOD PRESSURE: 122 MMHG | WEIGHT: 181 LBS | RESPIRATION RATE: 16 BRPM | DIASTOLIC BLOOD PRESSURE: 75 MMHG | TEMPERATURE: 98 F

## 2022-12-12 DIAGNOSIS — D64.89 ANEMIA DUE TO OTHER CAUSE, NOT CLASSIFIED: ICD-10-CM

## 2022-12-12 DIAGNOSIS — R63.4 WEIGHT LOSS, UNINTENTIONAL: ICD-10-CM

## 2022-12-12 DIAGNOSIS — N18.32 STAGE 3B CHRONIC KIDNEY DISEASE (H): ICD-10-CM

## 2022-12-12 DIAGNOSIS — R33.9 URINARY RETENTION: ICD-10-CM

## 2022-12-12 DIAGNOSIS — Z00.00 ENCOUNTER FOR MEDICARE ANNUAL WELLNESS EXAM: Primary | ICD-10-CM

## 2022-12-12 DIAGNOSIS — E78.5 HYPERLIPIDEMIA LDL GOAL <100: ICD-10-CM

## 2022-12-12 DIAGNOSIS — Z78.9 SELF-CATHETERIZES URINARY BLADDER: ICD-10-CM

## 2022-12-12 PROBLEM — R31.9 HEMATURIA: Status: RESOLVED | Noted: 2021-10-25 | Resolved: 2022-12-12

## 2022-12-12 PROBLEM — E87.5 HYPERKALEMIA: Status: RESOLVED | Noted: 2021-10-25 | Resolved: 2022-12-12

## 2022-12-12 PROBLEM — R97.20 RAISED PROSTATE SPECIFIC ANTIGEN: Status: ACTIVE | Noted: 2022-02-09

## 2022-12-12 PROBLEM — N18.30 CKD (CHRONIC KIDNEY DISEASE) STAGE 3, GFR 30-59 ML/MIN (H): Status: ACTIVE | Noted: 2021-10-25

## 2022-12-12 PROBLEM — R03.0 ELEVATED BLOOD PRESSURE READING WITHOUT DIAGNOSIS OF HYPERTENSION: Status: RESOLVED | Noted: 2021-10-25 | Resolved: 2022-12-12

## 2022-12-12 LAB
ALBUMIN SERPL BCG-MCNC: 4 G/DL (ref 3.5–5.2)
ALP SERPL-CCNC: 73 U/L (ref 40–129)
ALT SERPL W P-5'-P-CCNC: 16 U/L (ref 10–50)
ANION GAP SERPL CALCULATED.3IONS-SCNC: 12 MMOL/L (ref 7–15)
AST SERPL W P-5'-P-CCNC: 19 U/L (ref 10–50)
BILIRUB SERPL-MCNC: 0.4 MG/DL
BUN SERPL-MCNC: 26.2 MG/DL (ref 8–23)
CALCIUM SERPL-MCNC: 9.3 MG/DL (ref 8.8–10.2)
CHLORIDE SERPL-SCNC: 106 MMOL/L (ref 98–107)
CHOLEST SERPL-MCNC: 213 MG/DL
CREAT SERPL-MCNC: 1.28 MG/DL (ref 0.67–1.17)
DEPRECATED HCO3 PLAS-SCNC: 25 MMOL/L (ref 22–29)
ERYTHROCYTE [DISTWIDTH] IN BLOOD BY AUTOMATED COUNT: 12.7 % (ref 10–15)
GFR SERPL CREATININE-BSD FRML MDRD: 59 ML/MIN/1.73M2
GLUCOSE SERPL-MCNC: 93 MG/DL (ref 70–99)
HCT VFR BLD AUTO: 44.8 % (ref 40–53)
HDLC SERPL-MCNC: 46 MG/DL
HGB BLD-MCNC: 14.7 G/DL (ref 13.3–17.7)
HOLD SPECIMEN: NORMAL
HOLD SPECIMEN: NORMAL
LDLC SERPL CALC-MCNC: 147 MG/DL
MCH RBC QN AUTO: 31.7 PG (ref 26.5–33)
MCHC RBC AUTO-ENTMCNC: 32.8 G/DL (ref 31.5–36.5)
MCV RBC AUTO: 97 FL (ref 78–100)
NONHDLC SERPL-MCNC: 167 MG/DL
PLATELET # BLD AUTO: 158 10E3/UL (ref 150–450)
POTASSIUM SERPL-SCNC: 5.1 MMOL/L (ref 3.4–5.3)
PROT SERPL-MCNC: 6.3 G/DL (ref 6.4–8.3)
RBC # BLD AUTO: 4.64 10E6/UL (ref 4.4–5.9)
SODIUM SERPL-SCNC: 143 MMOL/L (ref 136–145)
TRIGL SERPL-MCNC: 98 MG/DL
WBC # BLD AUTO: 5.6 10E3/UL (ref 4–11)

## 2022-12-12 PROCEDURE — 80061 LIPID PANEL: CPT | Performed by: FAMILY MEDICINE

## 2022-12-12 PROCEDURE — 99213 OFFICE O/P EST LOW 20 MIN: CPT | Mod: 25 | Performed by: FAMILY MEDICINE

## 2022-12-12 PROCEDURE — 85027 COMPLETE CBC AUTOMATED: CPT | Performed by: FAMILY MEDICINE

## 2022-12-12 PROCEDURE — G0439 PPPS, SUBSEQ VISIT: HCPCS | Performed by: FAMILY MEDICINE

## 2022-12-12 PROCEDURE — 80053 COMPREHEN METABOLIC PANEL: CPT | Performed by: FAMILY MEDICINE

## 2022-12-12 PROCEDURE — 36415 COLL VENOUS BLD VENIPUNCTURE: CPT | Performed by: FAMILY MEDICINE

## 2022-12-12 ASSESSMENT — ENCOUNTER SYMPTOMS
HEARTBURN: 0
DYSURIA: 0
PARESTHESIAS: 0
DIARRHEA: 0
FREQUENCY: 0
DIZZINESS: 0
HEADACHES: 0
CONSTIPATION: 0
HEMATURIA: 0
CHILLS: 0
PALPITATIONS: 0
FEVER: 0
COUGH: 0
HEMATOCHEZIA: 0
ABDOMINAL PAIN: 1
SHORTNESS OF BREATH: 0
JOINT SWELLING: 0
NAUSEA: 0
MYALGIAS: 0
SORE THROAT: 0
NERVOUS/ANXIOUS: 0
WEAKNESS: 0
EYE PAIN: 0
ARTHRALGIAS: 0

## 2022-12-12 ASSESSMENT — ACTIVITIES OF DAILY LIVING (ADL): CURRENT_FUNCTION: NO ASSISTANCE NEEDED

## 2022-12-12 NOTE — ASSESSMENT & PLAN NOTE
Annual exam.  Overall he states that he feels quite well.  He continues to have symptoms of bladder discomfort.  This started after his prostate biopsy.  No hematuria.  His primary urologist is no longer in the system.  I recommended he be reevaluated by urology and placed a referral.  Fasting lab work completed today.  Discussed shingles vaccine.  Continues self-catheterization given detrusor dysfunction.

## 2022-12-12 NOTE — PROGRESS NOTES
"SUBJECTIVE:   Mono is a 72 year old who presents for Preventive Visit.    Chief Complaint   Patient presents with     Wellness Visit     Pt. Fasting.     Prostate Problem     Constant feeling of needing to urinate.     LUTS:    - \"only thing that is bothering.\"  \"whether it is pain or not.\"    - he has been self-cath-ing (per urology)   - he does not feel like he recovered.     - he generally has some relief following cath.  180-200ml and greater.    - Regla: now seeing DANTE Simpson.  Not a helpful experience.  The patient has done some pelvic floor exercises.     - no history of bladder infection.  Urine has been clear.      Overall, energy and stamina are back.  Strength low.  Eats well.  Low fat framework.  Not sure about fruit and veggie content. Some snacks.  He was able to complete a 10 hour hike in September.  Exercise is generally walking (treadmill or golf).  Some bands.  Low impact.      Patient has been advised of split billing requirements and indicates understanding: Yes  Are you in the first 12 months of your Medicare coverage?  No    Healthy Habits:     In general, how would you rate your overall health?  Good    Frequency of exercise:  2-3 days/week    Duration of exercise:  30-45 minutes    Do you usually eat at least 4 servings of fruit and vegetables a day, include whole grains    & fiber and avoid regularly eating high fat or \"junk\" foods?  No    Taking medications regularly:  Not Applicable    Medication side effects:  Not applicable    Ability to successfully perform activities of daily living:  No assistance needed    Home Safety:  No safety concerns identified    Hearing Impairment:  No hearing concerns    In the past 6 months, have you been bothered by leaking of urine?  No    In general, how would you rate your overall mental or emotional health?  Excellent      PHQ-2 Total Score: 0    Additional concerns today:  Yes      Have you ever done Advance Care Planning? (For example, a Health " Directive, POLST, or a discussion with a medical provider or your loved ones about your wishes): Yes, patient states has an Advance Care Planning document and will bring a copy to the clinic.     Fall risk  Fallen 2 or more times in the past year?: No  Any fall with injury in the past year?: No    Cognitive Screening   1) Repeat 3 items (Leader, Season, Table)  PASS  2) Clock draw: NORMAL  3) 3 item recall: Recalls 3 objects  Results: 3 items recalled: COGNITIVE IMPAIRMENT LESS LIKELY    Mini-CogTM Copyright AMINA De La Garza. Licensed by the author for use in [facility]; reprinted with permission (rakan@[facility]). All rights reserved.      Do you have sleep apnea, excessive snoring or daytime drowsiness?: no    Reviewed and updated as needed this visit by clinical staff   Tobacco  Allergies  Meds  Problems  Med Hx  Surg Hx  Fam Hx          Reviewed and updated as needed this visit by Provider   Tobacco  Allergies  Meds  Problems  Med Hx  Surg Hx  Fam Hx         Social History     Tobacco Use     Smoking status: Never     Smokeless tobacco: Never   Substance Use Topics     Alcohol use: Yes     Alcohol/week: 6.0 - 7.0 standard drinks     Comment: Small       Alcohol Use 12/5/2022   Prescreen: >3 drinks/day or >7 drinks/week? No     Current providers sharing in care for this patient include:   Patient Care Team:  Dru Lopez MD as PCP - General (Family Practice)  Dru Lopez MD as Assigned PCP    The following health maintenance items are reviewed in Epic and correct as of today:  Health Maintenance   Topic Date Due     MICROALBUMIN  Never done     ZOSTER IMMUNIZATION (1 of 2) Never done     LIPID  10/25/2022     BMP  11/12/2022     MEDICARE ANNUAL WELLNESS VISIT  12/12/2023     ANNUAL REVIEW OF HM ORDERS  12/12/2023     FALL RISK ASSESSMENT  12/12/2023     HEMOGLOBIN  12/12/2023     DTAP/TDAP/TD IMMUNIZATION (3 - Td or Tdap) 09/14/2025     COLORECTAL CANCER SCREENING  10/11/2026      "ADVANCE CARE PLANNING  12/12/2027     HEPATITIS C SCREENING  Completed     PHQ-2 (once per calendar year)  Completed     INFLUENZA VACCINE  Completed     URINALYSIS  Completed     COVID-19 Vaccine  Completed     IPV IMMUNIZATION  Aged Out     MENINGITIS IMMUNIZATION  Aged Out     Pneumococcal Vaccine: 65+ Years  Discontinued     AORTIC ANEURYSM SCREENING (SYSTEM ASSIGNED)  Discontinued     Labs reviewed in EPIC    Review of Systems   Constitutional: Negative for chills and fever.   HENT: Negative for congestion, ear pain, hearing loss and sore throat.    Eyes: Negative for pain and visual disturbance.   Respiratory: Negative for cough and shortness of breath.    Cardiovascular: Negative for chest pain, palpitations and peripheral edema.   Gastrointestinal: Positive for abdominal pain. Negative for constipation, diarrhea, heartburn, hematochezia and nausea.   Genitourinary: Negative for dysuria, frequency, genital sores, hematuria, impotence, penile discharge and urgency.   Musculoskeletal: Negative for arthralgias, joint swelling and myalgias.   Skin: Negative for rash.   Neurological: Negative for dizziness, weakness, headaches and paresthesias.   Psychiatric/Behavioral: Negative for mood changes. The patient is not nervous/anxious.    All other systems reviewed and are negative.    OBJECTIVE:   /75 (BP Location: Left arm, Patient Position: Sitting, Cuff Size: Adult Regular)   Pulse 60   Temp 98  F (36.7  C) (Oral)   Resp 16   Ht 1.811 m (5' 11.3\")   Wt 82.1 kg (181 lb)   SpO2 97%   BMI 25.03 kg/m   Estimated body mass index is 25.03 kg/m  as calculated from the following:    Height as of this encounter: 1.811 m (5' 11.3\").    Weight as of this encounter: 82.1 kg (181 lb).  Physical Exam  Vitals reviewed.   Constitutional:       General: He is not in acute distress.     Appearance: Normal appearance.   HENT:      Head: Normocephalic and atraumatic.      Right Ear: External ear normal.      Left Ear: " External ear normal.      Nose: Nose normal.      Mouth/Throat:      Pharynx: No oropharyngeal exudate or posterior oropharyngeal erythema.   Eyes:      General: No scleral icterus.     Comments: Right pupil enlarge.    Cardiovascular:      Rate and Rhythm: Normal rate and regular rhythm.      Heart sounds: Normal heart sounds. No murmur heard.    No friction rub. No gallop.   Pulmonary:      Effort: Pulmonary effort is normal. No respiratory distress.      Breath sounds: No wheezing.   Abdominal:      General: Bowel sounds are normal. There is no distension.      Palpations: Abdomen is soft. There is no mass.      Tenderness: There is no abdominal tenderness.   Musculoskeletal:         General: No swelling. Normal range of motion.      Cervical back: Normal range of motion.   Skin:     Findings: No rash.   Neurological:      General: No focal deficit present.      Mental Status: He is alert and oriented to person, place, and time.      Cranial Nerves: No cranial nerve deficit.      Deep Tendon Reflexes: Reflexes normal.   Psychiatric:         Mood and Affect: Mood normal.         Behavior: Behavior normal.         Thought Content: Thought content normal.         Judgment: Judgment normal.         Diagnostic Test Results:  Labs reviewed in Epic    ASSESSMENT / PLAN:     Problem List Items Addressed This Visit     CKD (chronic kidney disease) stage 3, GFR 30-59 ml/min (H)     Recheck.  Previously thought to be acute kidney injury.  Follow-up today.         Relevant Orders    Comprehensive metabolic panel (BMP + Alb, Alk Phos, ALT, AST, Total. Bili, TP)    Encounter for Medicare annual wellness exam - Primary     Annual exam.  Overall he states that he feels quite well.  He continues to have symptoms of bladder discomfort.  This started after his prostate biopsy.  No hematuria.  His primary urologist is no longer in the system.  I recommended he be reevaluated by urology and placed a referral.  Fasting lab work  "completed today.  Discussed shingles vaccine.  Continues self-catheterization given detrusor dysfunction.         Relevant Orders    PRIMARY CARE FOLLOW-UP SCHEDULING    Hyperlipidemia LDL goal <100    Relevant Orders    Lipid panel reflex to direct LDL Fasting    Other specified anemias    Relevant Orders    CBC with platelets (Completed)    Urinary retention    Relevant Orders    Adult Urology  Referral    RESOLVED: Weight loss, unintentional     Weight now stable.  Energy and stamina now back.         Other Visit Diagnoses     Self-catheterizes urinary bladder            Patient has been advised of split billing requirements and indicates understanding: Yes      COUNSELING:  Reviewed preventive health counseling, as reflected in patient instructions       Regular exercise       Healthy diet/nutrition    BMI:   Estimated body mass index is 25.03 kg/m  as calculated from the following:    Height as of this encounter: 1.811 m (5' 11.3\").    Weight as of this encounter: 82.1 kg (181 lb).   Weight management plan: Discussed healthy diet and exercise guidelines      He reports that he has never smoked. He has never used smokeless tobacco.      Appropriate preventive services were discussed with this patient, including applicable screening as appropriate for cardiovascular disease, diabetes, osteopenia/osteoporosis, and glaucoma.  As appropriate for age/gender, discussed screening for colorectal cancer, prostate cancer, breast cancer, and cervical cancer. Checklist reviewing preventive services available has been given to the patient.    Reviewed patients plan of care and provided an AVS. The Basic Care Plan (routine screening as documented in Health Maintenance) for Mono meets the Care Plan requirement. This Care Plan has been established and reviewed with the Patient.          Dru Lopez MD  [facility]    Identified Health Risks:    The patient was counseled and encouraged " to consider modifying their diet and eating habits. He was provided with information on recommended healthy diet options.

## 2022-12-12 NOTE — PATIENT INSTRUCTIONS
Saturated fat: Saturated Fats and Health: A Reassessment and Proposal for Food-Based Recommendations: [facility] State-of-the-Art Review June (https://www.jacc.org/doi/full/10.1016/j.jacc.2020.05.077)    A Pesco-Mediterranean Diet With Intermittent Fasting: [facility] Review Topic of the Week (https://www.jacc.org/doi/full/10.1016/j.jacc.2020.07.049)        Patient Education   Personalized Prevention Plan  You are due for the preventive services outlined below.  Your care team is available to assist you in scheduling these services.  If you have already completed any of these items, please share that information with your care team to update in your medical record.  Health Maintenance Due   Topic Date Due    ANNUAL REVIEW OF HM ORDERS  Never done    Zoster (Shingles) Vaccine (1 of 2) Never done    AORTIC ANEURYSM SCREENING (SYSTEM ASSIGNED)  Never done    Pneumococcal Vaccine (3) 10/28/2020       Understanding USDA MyPlate  The USDA has guidelines to help you make healthy food choices. These are called MyPlate. MyPlate shows the food groups that make up healthy meals using the image of a place setting. Before you eat, think about the healthiest choices for what to put on your plate or in your cup or bowl. To learn more about building a healthy plate, visit www.choosemyplate.gov.    The food groups  Fruits. Any fruit or 100% fruit juice counts as part of the Fruit Group. Fruits may be fresh, canned, frozen, or dried, and may be whole, cut-up, or pureed. Make 1/2 of your plate fruits and vegetables.  Vegetables. Any vegetable or 100% vegetable juice counts as a member of the Vegetable Group. Vegetables may be fresh, frozen, canned, or dried. They can be served raw or cooked and may be whole, cut-up, or mashed. Make 1/2 of your plate fruits and vegetables.  Grains. All foods made from grains are part of the Grains Group. These include wheat, rice, oats, cornmeal, and barley. Grains are often used to make foods such as bread,  pasta, oatmeal, cereal, tortillas, and grits. Grains should be no more than 1/4 of your plate. At least half of your grains should be whole grains.  Protein. This group includes meat, poultry, seafood, beans and peas, eggs, processed soy products (such as tofu), nuts (including nut butters), and seeds. Make protein choices no more than 1/4 of your plate. Meat and poultry choices should be lean or low fat.  Dairy. The Dairy Group includes all fluid milk products and foods made from milk that contain calcium, such as yogurt and cheese. (Foods that have little calcium, such as cream, butter, and cream cheese, are not part of this group.) Most dairy choices should be low-fat or fat-free.  Oils. Oils aren't a food group, but they do contain essential nutrients. However it's important to watch your intake of oils. These are fats that are liquid at room temperature. They include canola, corn, olive, soybean, vegetable, and sunflower oil. Foods that are mainly oil include mayonnaise, certain salad dressings, and soft margarines. You likely already get your daily oil allowance from the foods you eat.  Things to limit  Eating healthy also means limiting these things in your diet:     Salt (sodium). Many processed foods have a lot of sodium. To keep sodium intake down, eat fresh vegetables, meats, poultry, and seafood when possible. Purchase low-sodium, reduced-sodium, or no-salt-added food products at the store. And don't add salt to your meals at home. Instead, season them with herbs and spices such as dill, oregano, cumin, and paprika. Or try adding flavor with lemon or lime zest and juice.  Saturated fat. Saturated fats are most often found in animal products such as beef, pork, and chicken. They are often solid at room temperature, such as butter. To reduce your saturated fat intake, choose leaner cuts of meat and poultry. And try healthier cooking methods such as grilling, broiling, roasting, or baking. For a simple  lower-fat swap, use plain nonfat yogurt instead of mayonnaise when making potato salad or macaroni salad.  Added sugars. These are sugars added to foods. They are in foods such as ice cream, candy, soda, fruit drinks, sports drinks, energy drinks, cookies, pastries, jams, and syrups. Cut down on added sugars by sharing sweet treats with a family member or friend. You can also choose fruit for dessert, and drink water or other unsweetened beverages.     [employer] last reviewed this educational content on 6/1/2020 2000-2021 [employer]. All rights reserved. This information is not intended as a substitute for professional medical care. Always follow your healthcare professional's instructions.

## 2022-12-12 NOTE — Clinical Note
Dr. Ritter, this patient feels a bit lost since Dr. Arauz left.  He has some ongoing symptoms and did not have a very positive experience with another provider at [facility].  Wondering if you could see him.  I did place referral and so he may get scheduled.  Dru Padilla

## 2023-11-13 ENCOUNTER — PATIENT OUTREACH (OUTPATIENT)
Dept: CARE COORDINATION | Facility: CLINIC | Age: 73
End: 2023-11-13
Payer: MEDICARE

## 2023-11-27 ENCOUNTER — PATIENT OUTREACH (OUTPATIENT)
Dept: CARE COORDINATION | Facility: CLINIC | Age: 73
End: 2023-11-27
Payer: MEDICARE

## 2023-12-28 ENCOUNTER — PATIENT OUTREACH (OUTPATIENT)
Dept: CARE COORDINATION | Facility: CLINIC | Age: 73
End: 2023-12-28
Payer: MEDICARE

## 2024-01-11 ENCOUNTER — PATIENT OUTREACH (OUTPATIENT)
Dept: CARE COORDINATION | Facility: CLINIC | Age: 74
End: 2024-01-11
Payer: MEDICARE

## 2024-01-23 ENCOUNTER — TELEPHONE (OUTPATIENT)
Dept: FAMILY MEDICINE | Facility: CLINIC | Age: 74
End: 2024-01-23

## 2024-01-23 NOTE — TELEPHONE ENCOUNTER
Patient Quality Outreach    Patient is due for the following:   Physical Annual Wellness Visit    Next Steps:   Schedule a Annual Wellness Visit    Type of outreach:    Sent Sc[employer] message.      Questions for provider review:    None           Catracho Mccarthy MA  Chart routed to Care Team.

## 2024-01-23 NOTE — LETTER
Mono Galaviz     [address] [facility] [address]       5/20/2024    Dear Mono,     In reviewing your records, we have determined a gap in your preventive services. Based on your age and health history, we recommend the follow service.     General Physical      If you have had the service elsewhere, please contact us so we can update our records. Please let us know if you have transferred your care to another clinic.    Please call [phone] to schedule this appointment.    We believe that a strong preventive care program, including regular physicals and follow-up care is an important part of a healthy lifestyle and we are committed to helping you maintain your health.    Thank you for choosing us as your health care provider.    Sincerely,     [facility]

## 2024-02-19 NOTE — TELEPHONE ENCOUNTER
2nd attempted    Patient Quality Outreach    Patient is due for the following:   Physical Annual Wellness Visit    Next Steps:   Schedule a Annual Wellness Visit    Type of outreach:    Phone, left message for patient/parent to call back.    Next Steps:  Reach out within 90 days via Letter.    Max number of attempts reached: No. Will try again in 90 days if patient still on fail list.    Questions for provider review:    None           Catracho Mccarthy MA  Chart routed to Care Team.

## 2024-03-02 ENCOUNTER — HEALTH MAINTENANCE LETTER (OUTPATIENT)
Age: 74
End: 2024-03-02

## 2024-05-20 NOTE — TELEPHONE ENCOUNTER
3rd attempted  Patient Quality Outreach    Patient is due for the following:   Physical Annual Wellness Visit     Next Steps:   Schedule a Annual Wellness Visit    Type of outreach:    Sent letter.    Next Steps:  Reach out within 90 days via Letter.    Max number of attempts reached: Yes. Will try again in 90 days if patient still on fail list.    Questions for provider review:    None           Catracho Mccarthy MA

## 2024-07-08 ENCOUNTER — APPOINTMENT (OUTPATIENT)
Dept: URBAN - METROPOLITAN AREA CLINIC 260 | Age: 74
Setting detail: DERMATOLOGY
End: 2024-07-08

## 2024-07-08 VITALS — WEIGHT: 180 LBS | HEIGHT: 60 IN

## 2024-07-08 DIAGNOSIS — Z71.89 OTHER SPECIFIED COUNSELING: ICD-10-CM

## 2024-07-08 DIAGNOSIS — L82.1 OTHER SEBORRHEIC KERATOSIS: ICD-10-CM

## 2024-07-08 DIAGNOSIS — L81.4 OTHER MELANIN HYPERPIGMENTATION: ICD-10-CM

## 2024-07-08 DIAGNOSIS — D22 MELANOCYTIC NEVI: ICD-10-CM

## 2024-07-08 DIAGNOSIS — L57.0 ACTINIC KERATOSIS: ICD-10-CM

## 2024-07-08 DIAGNOSIS — D18.0 HEMANGIOMA: ICD-10-CM

## 2024-07-08 DIAGNOSIS — D49.2 NEOPLASM OF UNSPECIFIED BEHAVIOR OF BONE, SOFT TISSUE, AND SKIN: ICD-10-CM

## 2024-07-08 PROBLEM — D18.01 HEMANGIOMA OF SKIN AND SUBCUTANEOUS TISSUE: Status: ACTIVE | Noted: 2024-07-08

## 2024-07-08 PROBLEM — D22.5 MELANOCYTIC NEVI OF TRUNK: Status: ACTIVE | Noted: 2024-07-08

## 2024-07-08 PROCEDURE — OTHER LIQUID NITROGEN: OTHER

## 2024-07-08 PROCEDURE — OTHER PHOTO-DOCUMENTATION: OTHER

## 2024-07-08 PROCEDURE — 11102 TANGNTL BX SKIN SINGLE LES: CPT | Mod: 59

## 2024-07-08 PROCEDURE — 99213 OFFICE O/P EST LOW 20 MIN: CPT | Mod: 25

## 2024-07-08 PROCEDURE — 11103 TANGNTL BX SKIN EA SEP/ADDL: CPT

## 2024-07-08 PROCEDURE — OTHER COUNSELING: OTHER

## 2024-07-08 PROCEDURE — 17004 DESTROY PREMAL LESIONS 15/>: CPT

## 2024-07-08 PROCEDURE — OTHER BIOPSY BY SHAVE METHOD: OTHER

## 2024-07-08 PROCEDURE — OTHER MIPS QUALITY: OTHER

## 2024-07-08 ASSESSMENT — LOCATION SIMPLE DESCRIPTION DERM
LOCATION SIMPLE: RIGHT THIGH
LOCATION SIMPLE: RIGHT TEMPLE
LOCATION SIMPLE: RIGHT CHEEK
LOCATION SIMPLE: LEFT CHEEK
LOCATION SIMPLE: RIGHT ZYGOMA
LOCATION SIMPLE: RIGHT NOSE
LOCATION SIMPLE: SCALP
LOCATION SIMPLE: LEFT TEMPLE
LOCATION SIMPLE: LEFT FOREHEAD
LOCATION SIMPLE: UPPER BACK
LOCATION SIMPLE: LOWER BACK
LOCATION SIMPLE: POSTERIOR SCALP
LOCATION SIMPLE: NOSE
LOCATION SIMPLE: RIGHT ELBOW

## 2024-07-08 ASSESSMENT — LOCATION ZONE DERM
LOCATION ZONE: ARM
LOCATION ZONE: TRUNK
LOCATION ZONE: SCALP
LOCATION ZONE: FACE
LOCATION ZONE: NOSE
LOCATION ZONE: LEG

## 2024-07-08 ASSESSMENT — LOCATION DETAILED DESCRIPTION DERM
LOCATION DETAILED: RIGHT INFERIOR CENTRAL MALAR CHEEK
LOCATION DETAILED: RIGHT ANTERIOR PROXIMAL THIGH
LOCATION DETAILED: POSTERIOR MID-PARIETAL SCALP
LOCATION DETAILED: RIGHT CENTRAL ZYGOMA
LOCATION DETAILED: RIGHT ANTERIOR DISTAL THIGH
LOCATION DETAILED: RIGHT CENTRAL BUCCAL CHEEK
LOCATION DETAILED: LEFT INFERIOR CENTRAL MALAR CHEEK
LOCATION DETAILED: LEFT INFERIOR POSTAURICULAR SKIN
LOCATION DETAILED: SUPERIOR LUMBAR SPINE
LOCATION DETAILED: LEFT SUPERIOR CENTRAL MALAR CHEEK
LOCATION DETAILED: RIGHT NASAL DORSUM
LOCATION DETAILED: LEFT CENTRAL TEMPLE
LOCATION DETAILED: INFERIOR THORACIC SPINE
LOCATION DETAILED: LEFT SUPERIOR PARIETAL SCALP
LOCATION DETAILED: RIGHT CENTRAL TEMPLE
LOCATION DETAILED: RIGHT NASAL SIDEWALL
LOCATION DETAILED: LEFT MEDIAL FOREHEAD
LOCATION DETAILED: RIGHT ANTECUBITAL SKIN

## 2024-07-08 NOTE — PROCEDURE: LIQUID NITROGEN
Render Post-Care Instructions In Note?: yes
Number Of Freeze-Thaw Cycles: 3 freeze-thaw cycles
Detail Level: Detailed
Consent: The patient's consent was obtained including but not limited to risks of crusting, scabbing, blistering, scarring, darker or lighter pigmentary change, recurrence, incomplete removal and infection.
Post-Care Instructions: I reviewed with the patient in detail post-care instructions. Patient is to wear sunprotection, and avoid picking at any of the treated lesions. Pt may apply Vaseline to crusted or scabbing areas.
Duration Of Freeze Thaw-Cycle (Seconds): 3
Application Tool (Optional): Liquid Nitrogen Sprayer
Render Note In Bullet Format When Appropriate: No

## 2024-07-08 NOTE — PROCEDURE: BIOPSY BY SHAVE METHOD
Detail Level: Detailed
Depth Of Biopsy: dermis
Was A Bandage Applied: Yes
Size Of Lesion In Cm: 0
Biopsy Type: H and E
Biopsy Method: Dermablade
Anesthesia Type: 1% lidocaine with epinephrine
Anesthesia Volume In Cc: 0.5
Hemostasis: Drysol
Wound Care: Petrolatum
Dressing: bandage
Destruction After The Procedure: No
Type Of Destruction Used: Curettage
Curettage Text: The wound bed was treated with curettage after the biopsy was performed.
Cryotherapy Text: The wound bed was treated with cryotherapy after the biopsy was performed.
Electrodesiccation Text: The wound bed was treated with electrodesiccation after the biopsy was performed.
Electrodesiccation And Curettage Text: The wound bed was treated with electrodesiccation and curettage after the biopsy was performed.
Silver Nitrate Text: The wound bed was treated with silver nitrate after the biopsy was performed.
Lab: -[id number]
Consent: Written consent was obtained and risks were reviewed including but not limited to scarring, infection, bleeding, scabbing, incomplete removal, nerve damage and allergy to anesthesia.
Post-Care Instructions: I reviewed with the patient in detail post-care instructions. Patient is to keep the biopsy site dry overnight, and then apply bacitracin twice daily until healed. Patient may apply hydrogen peroxide soaks to remove any crusting.
Notification Instructions: Patient will be notified of biopsy results. However, patient instructed to call the office if not contacted within 2 weeks.
Billing Type: Third-Party Bill
Information: Selecting Yes will display possible errors in your note based on the variables you have selected. This validation is only offered as a suggestion for you. PLEASE NOTE THAT THE VALIDATION TEXT WILL BE REMOVED WHEN YOU FINALIZE YOUR NOTE. IF YOU WANT TO FAX A PRELIMINARY NOTE YOU WILL NEED TO TOGGLE THIS TO 'NO' IF YOU DO NOT WANT IT IN YOUR FAXED NOTE.

## 2024-11-18 ENCOUNTER — TRANSFERRED RECORDS (OUTPATIENT)
Dept: HEALTH INFORMATION MANAGEMENT | Facility: CLINIC | Age: 74
End: 2024-11-18
Payer: MEDICARE

## 2024-11-25 ENCOUNTER — TELEPHONE (OUTPATIENT)
Dept: INTERNAL MEDICINE | Facility: CLINIC | Age: 74
End: 2024-11-25
Payer: MEDICARE

## 2024-11-25 NOTE — TELEPHONE ENCOUNTER
LMOM for call back. If pt calls back please let him know I did reschedule pt for the same day, later appt for a 40 min appt. Have him please arrive at 3:20pm. Thank you.

## 2024-11-25 NOTE — TELEPHONE ENCOUNTER
Patient is scheduled to discuss high heart rate for 20-minute appointment.  I have not seen this patient previous.  Is patient tried to establish care?  Patient should follow-up with primary care provider.  I can function as an urgent care appointment for that visit, but I would not be able to delve into any chronic problems or start a chronic workup.  Patient should either follow-up with his primary care provider or establish care with new primary care provider if that is what patient needs.  If emergent assessment is needed, go to urgent care

## 2024-11-27 ENCOUNTER — OFFICE VISIT (OUTPATIENT)
Dept: INTERNAL MEDICINE | Facility: CLINIC | Age: 74
End: 2024-11-27
Payer: MEDICARE

## 2024-11-27 VITALS
BODY MASS INDEX: 25.2 KG/M2 | OXYGEN SATURATION: 98 % | WEIGHT: 180 LBS | TEMPERATURE: 98.5 F | DIASTOLIC BLOOD PRESSURE: 80 MMHG | SYSTOLIC BLOOD PRESSURE: 142 MMHG | HEIGHT: 71 IN | RESPIRATION RATE: 18 BRPM | HEART RATE: 69 BPM

## 2024-11-27 DIAGNOSIS — R33.8 BENIGN PROSTATIC HYPERPLASIA WITH URINARY RETENTION: ICD-10-CM

## 2024-11-27 DIAGNOSIS — N40.1 BENIGN PROSTATIC HYPERPLASIA WITH URINARY RETENTION: ICD-10-CM

## 2024-11-27 DIAGNOSIS — Z86.0100 HISTORY OF COLONIC POLYPS: ICD-10-CM

## 2024-11-27 DIAGNOSIS — I47.10 PAROXYSMAL SUPRAVENTRICULAR TACHYCARDIA (H): Primary | ICD-10-CM

## 2024-11-27 LAB
ALBUMIN SERPL BCG-MCNC: 4.1 G/DL (ref 3.5–5.2)
ALP SERPL-CCNC: 71 U/L (ref 40–150)
ALT SERPL W P-5'-P-CCNC: 16 U/L (ref 0–70)
ANION GAP SERPL CALCULATED.3IONS-SCNC: 10 MMOL/L (ref 7–15)
AST SERPL W P-5'-P-CCNC: 23 U/L (ref 0–45)
BILIRUB SERPL-MCNC: 0.5 MG/DL
BUN SERPL-MCNC: 27.9 MG/DL (ref 8–23)
CALCIUM SERPL-MCNC: 9.6 MG/DL (ref 8.8–10.4)
CHLORIDE SERPL-SCNC: 104 MMOL/L (ref 98–107)
CREAT SERPL-MCNC: 1.22 MG/DL (ref 0.67–1.17)
EGFRCR SERPLBLD CKD-EPI 2021: 62 ML/MIN/1.73M2
ERYTHROCYTE [DISTWIDTH] IN BLOOD BY AUTOMATED COUNT: 13 % (ref 10–15)
GLUCOSE SERPL-MCNC: 92 MG/DL (ref 70–99)
HCO3 SERPL-SCNC: 25 MMOL/L (ref 22–29)
HCT VFR BLD AUTO: 46.6 % (ref 40–53)
HGB BLD-MCNC: 15.1 G/DL (ref 13.3–17.7)
MCH RBC QN AUTO: 30.9 PG (ref 26.5–33)
MCHC RBC AUTO-ENTMCNC: 32.4 G/DL (ref 31.5–36.5)
MCV RBC AUTO: 95 FL (ref 78–100)
PLATELET # BLD AUTO: 173 10E3/UL (ref 150–450)
POTASSIUM SERPL-SCNC: 4.3 MMOL/L (ref 3.4–5.3)
PROT SERPL-MCNC: 7 G/DL (ref 6.4–8.3)
RBC # BLD AUTO: 4.89 10E6/UL (ref 4.4–5.9)
SODIUM SERPL-SCNC: 139 MMOL/L (ref 135–145)
TSH SERPL DL<=0.005 MIU/L-ACNC: 1.75 UIU/ML (ref 0.3–4.2)
WBC # BLD AUTO: 7.2 10E3/UL (ref 4–11)

## 2024-11-27 PROCEDURE — 80053 COMPREHEN METABOLIC PANEL: CPT | Performed by: INTERNAL MEDICINE

## 2024-11-27 PROCEDURE — 84443 ASSAY THYROID STIM HORMONE: CPT | Performed by: INTERNAL MEDICINE

## 2024-11-27 PROCEDURE — 85027 COMPLETE CBC AUTOMATED: CPT | Performed by: INTERNAL MEDICINE

## 2024-11-27 PROCEDURE — 36415 COLL VENOUS BLD VENIPUNCTURE: CPT | Performed by: INTERNAL MEDICINE

## 2024-11-27 PROCEDURE — 93010 ELECTROCARDIOGRAM REPORT: CPT | Mod: OFF | Performed by: STUDENT IN AN ORGANIZED HEALTH CARE EDUCATION/TRAINING PROGRAM

## 2024-11-27 NOTE — PATIENT INSTRUCTIONS
You had an episode of supraventricular tachycardia.  This may be atrial fibrillation, but unclear.    Wear your Apple Watch at all times.  If you have an episode of fast heart rate or palpitations, activate your Zio patch to record that heart rate.  Mail your Zio patch back after the 2 weeks is up.    Schedule a heart echo.    Avoid caffeine and alcohol.  Continue to get regular exercise with treadmill.    Continue to straight cath regularly.  If your bladder gets too stretched out and your bladder gets too full, sometimes that can cause fast heart rate spells.  Continue to follow-up with urology for your bladder and prostate issue.    Look on your [employer] computer portal for results of tests ordered today.    See me in clinic in about 3 weeks to review your workup and your condition, to see if there is anything further to do at that time.    I would plan to have you see me next year for an adult wellness visit physical exam.    Get your COVID and flu shot.

## 2024-11-27 NOTE — PROGRESS NOTES
Mono Galaviz   74 year old male  Mono Galaviz arrived here on 11/27/2024 4:28 PM for 8-14 Days  Zio monitor placement per ordering provider Dr Evangelista for the diagnosis palpitations.  Patient s skin was prepped per protocol. Dr. Evangelista is the supervising MD.  Zio monitor was placed.  Instructions were reviewed with and given to the patient.  Patient verbalized understanding of wear, troubleshooting and monitor return instructions.     Date of Visit: 11/27/2024    Chief Complaint   Patient presents with    Palpitations     Heart palpitations on 11/16. Heart rate was fluctuating and was up in the 130's at rest, lasted about 3 hours.     Subjective  74-year-old retired [employer]  who lives independently with wife is here to establish care and discuss an episode of rapid heart rate that occurred on November 16.    Patient is then without family history of coronary disease, and does not have significant risk factors for coronary disease.  He is normally quite active with exercise on the treadmill with good exercise tolerance.    He is thin without history of sleep apnea or daytime sleepiness.  He does not drink alcohol.    Patient was working on a project on November 16 and drinking quite a bit of caffeine.  He suddenly noticed a rapid heart rate spell when he was sitting down in a chair.  He put on his Apple Watch and noted his heart rate going between 60 and 130.  The tracing on the Apple Watch did state atrial fibrillation, I did review the tracings which she had recorded, I did not see P waves and the does appear to be irregularity in the R waves.  It is narrow complex.  Poor baseline tracing, however.    Patient stated that he had intermittent fast heart rates for about 3 hours.  He went to bed and it resolved shortly after he went to sleep.    He did not have shortness of breath chest pain or lightheaded dizzy spells or weakness or other symptoms with this.    He has not had recurrence of this  episode.    Patient remembers having an episode similar to this 10 years ago, and he also had 2 episodes when he was in his 30s, but states he had a negative workup at that time.  He states he was very dehydrated 10 years ago when he had it.    In 2021 he had acute renal failure with bladder obstruction from severe BPH.  He has been straight cathing himself since then about 4 times a day.  He generally gets 400 cc out.  No gross hematuria or problems with straight cathing.  No recent infection.    He has severe BPH, he is seeing urology regularly for that.  He has an elevated PSA of 8.0 recently, and they may plan another MRI of the prostate.  Previous biopsy in 2021 was negative cancer.    In 2022 hemoglobin was normal and creatinine was 1.28.   and HDL 46.    He is never smoked.    He has not had recent change in medical status.  He denies worsening fatigue, or weight change, or edema or skin changes or any worsening shortness of breath with exercise.    No family history of colon cancer.  November 2021 colonoscopy showed 2 tubular adenomas with a 5-year follow-up plan    PMHx:    Past Medical History:   Diagnosis Date    Elevated blood pressure reading without diagnosis of hypertension 10/25/2021    Kidney stone     Weight loss, unintentional 10/25/2021     PSHx:    Past Surgical History:   Procedure Laterality Date    BIOPSY  Jan 2022    Prostate biopsy    COLONOSCOPY      TONSILLECTOMY & ADENOIDECTOMY      ZC APPENDECTOMY      Description: Appendectomy;  Recorded: 04/25/2013;     Immunizations:   Immunization History   Administered Date(s) Administered    COVID-19 12+ (Pfizer) 11/12/2023    COVID-19 Bivalent 12+ (Pfizer) 11/04/2022    COVID-19 MONOVALENT 12+ (Pfizer) 01/26/2021, 02/16/2021, 10/02/2021    Influenza (H1N1) 01/20/2010    Influenza (High Dose) Trivalent,PF (Fluzone) 10/28/2019    Influenza Vaccine 65+ (Fluzone HD) 12/27/2021, 11/04/2022    Influenza Vaccine >6 months,quad, PF  "09/14/2015    Pneumo Conj 13-V (2010&after) 10/28/2019    Pneumococcal 23 valent 09/14/2015    Pneumococcal, Unspecified 09/14/2016    TDAP (Adacel,Boostrix) 05/25/2010, 09/14/2015       ROS A comprehensive review of systems was performed and was otherwise negative    Medications, allergies, and problem list were reviewed and updated    Exam  BP (!) 142/80   Pulse 69   Temp 98.5  F (36.9  C)   Resp 18   Ht 1.811 m (5' 11.3\")   Wt 81.6 kg (180 lb)   SpO2 98%   BMI 24.89 kg/m    Healthy-appearing thin patient.  Alert and oriented x 3 with normal mentation.  Normal mobility.  Right pupil noted to be slightly larger, noted previously.  No jaundice.  Pharynx appears normal without crowding.  Teeth in good condition.  No cervical adenopathy and no significant neck adiposity.  No carotid bruits.  No JVD.  No thyromegaly or nodularity to inspection and palpation.  Lungs are clear to auscultation with good respiratory excursion.  Heart is regular with no murmur rub or gallop.  There is no ankle edema.  +1 pedal pulses.  Abdomen is thin, nontender and no hepatosplenomegaly or pulsatile abdominal mass.  Skin exam    Assessment/Plan  1. Paroxysmal supraventricular tachycardia (H) (Primary)  EKG tracing is suggestive for an episode of paroxysmal atrial fibrillation.  He was drinking quite a bit of caffeine at the time, otherwise no clear precipitating factors.    He had a normal echo back in 2021.  He does not have underlying sleep apnea.  Does not drink alcohol.  No history of ischemic heart disease he has good exercise tolerance on the treadmill.    Proceed with heart monitor.  He gave okay to place heart monitor in clinic, he was warned about extra charge.    Proceed with echo and lab workup as below.  Follow-up with me in 3 weeks to review initial workup and to see if he has any recurrent symptoms.    If any significant findings on workup or if he has continued recurrent symptoms, plan referral to cardiology.    He " was counseled to avoid caffeine.  - EKG 12-lead, tracing only  - ZIO PATCH 8-14 DAYS (additional cost to patient)  - ZIO PATCH 8-14 DAYS APPLICATION  - Echocardiogram Complete; Future  - CBC with platelets  - Comprehensive metabolic panel  - TSH with free T4 reflex    EKG read by me today was a normal EKG.    2. Benign prostatic hyperplasia with urinary retention  This issue is managed by urology.  Severe BPH that led to a neurogenic bladder without detrusor muscle function.  In 2021 it was noted the at 2.7 L in the bladder before they put a Costa in him.  Evaluation was negative for prostate cancer at the time.  He was determined that he would not benefit from a TURP as he had no significant detrusor muscle activity and would need to straight cath in any case.  He has not had recurrent urinary tract infection issues.    He is going to follow-up with urology for elevated PSA and further prostate cancer workup if needed    3. History of colonic polyps  5-year colonoscopy due November 2026    He is plan to get a COVID and flu shot but plans to get that later at a drugstore.      Return in about 20 days (around 12/17/2024) for Follow-up appointment.   Patient Instructions   You had an episode of supraventricular tachycardia.  This may be atrial fibrillation, but unclear.    Wear your Apple Watch at all times.  If you have an episode of fast heart rate or palpitations, activate your Zio patch to record that heart rate.  Mail your Zio patch back after the 2 weeks is up.    Schedule a heart echo.    Avoid caffeine and alcohol.  Continue to get regular exercise with treadmill.    Continue to straight cath regularly.  If your bladder gets too stretched out and your bladder gets too full, sometimes that can cause fast heart rate spells.  Continue to follow-up with urology for your bladder and prostate issue.    Look on your [employer] computer portal for results of tests ordered today.    See me in clinic in about 3 weeks to  review your workup and your condition, to see if there is anything further to do at that time.    I would plan to have you see me next year for an adult wellness visit physical exam.    Get your COVID and flu shot.    Jamaal Evangelista MD, MD        Current Outpatient Medications   Medication Sig Dispense Refill    MULTIPLE VITAMIN PO Take by mouth daily Mostly in the winter       No Known Allergies  Social History     Tobacco Use    Smoking status: Never     Passive exposure: Never    Smokeless tobacco: Never   Vaping Use    Vaping status: Never Used   Substance Use Topics    Alcohol use: Yes     Alcohol/week: 6.0 - 7.0 standard drinks of alcohol     Comment: Small    Drug use: No             Subjective   Mono is a 74 year old, presenting for the following health issues:  Palpitations (Heart palpitations on 11/16. Heart rate was fluctuating and was up in the 130's at rest, lasted about 3 hours.)      11/27/2024     3:24 PM   Additional Questions   Roomed by Beulah ROSE   Accompanied by VITALY     Palpitations    History of Present Illness       He eats 2-3 servings of fruits and vegetables daily.He consumes 0 sweetened beverage(s) daily.He exercises with enough effort to increase his heart rate 20 to 29 minutes per day.  He exercises with enough effort to increase his heart rate 6 days per week. He is missing 3 dose(s) of medications per week.  He is not taking prescribed medications regularly due to remembering to take.                     Objective    There were no vitals taken for this visit.  There is no height or weight on file to calculate BMI.  Physical Exam               Signed Electronically by: Jamaal Evangelista MD

## 2024-11-28 LAB
ATRIAL RATE - MUSE: 62 BPM
DIASTOLIC BLOOD PRESSURE - MUSE: NORMAL MMHG
INTERPRETATION ECG - MUSE: NORMAL
P AXIS - MUSE: 47 DEGREES
PR INTERVAL - MUSE: 150 MS
QRS DURATION - MUSE: 86 MS
QT - MUSE: 402 MS
QTC - MUSE: 408 MS
R AXIS - MUSE: -26 DEGREES
SYSTOLIC BLOOD PRESSURE - MUSE: NORMAL MMHG
T AXIS - MUSE: 35 DEGREES
VENTRICULAR RATE- MUSE: 62 BPM

## 2024-12-16 LAB — CV ZIO PRELIM RESULTS: NORMAL

## 2024-12-17 ENCOUNTER — OFFICE VISIT (OUTPATIENT)
Dept: INTERNAL MEDICINE | Facility: CLINIC | Age: 74
End: 2024-12-17
Payer: MEDICARE

## 2024-12-17 VITALS
WEIGHT: 179 LBS | HEIGHT: 71 IN | HEART RATE: 56 BPM | SYSTOLIC BLOOD PRESSURE: 120 MMHG | OXYGEN SATURATION: 97 % | DIASTOLIC BLOOD PRESSURE: 72 MMHG | RESPIRATION RATE: 18 BRPM | BODY MASS INDEX: 25.06 KG/M2 | TEMPERATURE: 97.6 F

## 2024-12-17 DIAGNOSIS — N40.1 BENIGN PROSTATIC HYPERPLASIA WITH URINARY RETENTION: ICD-10-CM

## 2024-12-17 DIAGNOSIS — I47.19 ECTOPIC ATRIAL TACHYCARDIA (H): Primary | ICD-10-CM

## 2024-12-17 DIAGNOSIS — R33.8 BENIGN PROSTATIC HYPERPLASIA WITH URINARY RETENTION: ICD-10-CM

## 2024-12-17 PROCEDURE — 99213 OFFICE O/P EST LOW 20 MIN: CPT | Performed by: INTERNAL MEDICINE

## 2024-12-17 PROCEDURE — G2211 COMPLEX E/M VISIT ADD ON: HCPCS | Performed by: INTERNAL MEDICINE

## 2024-12-17 NOTE — PATIENT INSTRUCTIONS
Your rapid heart rate spells will likely supraventricular tachycardia, more specifically nonsustained ectopic atrial tachycardia.  You do not need to be on a blood thinner for this type of tachycardia.  This tachycardia can often be precipitated by stress, lack of sleep, caffeine or other illness.    Avoiding caffeine, getting regular exercise and avoiding stresses or treatment plan.    If you continue to get recurrent rapid heart rate spells on your Apple Watch or increasing palpitations, we can redo the Zio patch.    Proceed with your heart echo to make sure that is normal.    Get your COVID and flu shot as planned.    See me on May 2 for adult wellness visit physical exam, come fasting.

## 2024-12-17 NOTE — PROGRESS NOTES
Mono Galaviz   74 year old male    Date of Visit: 12/17/2024    Chief Complaint   Patient presents with    Follow Up     Pt is fasting     Subjective  74-year-old retired [employer]  had an episode of rapid heart rate spell while sitting on November 16 while under some stress and drinking quite a bit of caffeine.  Apple watch noted heart rate going up to 130.  Unclear if it was atrial fibrillation on the Apple Watch but not clear P waves.  Narrow complex.  No other symptoms.  No chest pain or shortness of breath.  Does not drink alcohol.    He does have bladder outlet obstruction issues since severe BPH in 2021 now with neurogenic bladder but he has been straight cathing himself 4 times a day and had not had a UTI or episode of bladder retention at that time.  He is working with urology.    Since that episode he had no recurrent symptomatic palpitation episodes.    He underwent a Zio patch which showed episodes of paroxysmal nonsustained atrial tachycardia but no atrial fibrillation.  He did not have any symptom events.    He is exercising regularly.  He can walk on the treadmill with heart rate of 240 without ectopy or chest pain or shortness of breath or difficulty.    Heart echo still pending.    No recent febrile illness.    PMHx:    Past Medical History:   Diagnosis Date    Elevated blood pressure reading without diagnosis of hypertension 10/25/2021    Kidney stone     Weight loss, unintentional 10/25/2021     PSHx:    Past Surgical History:   Procedure Laterality Date    BIOPSY  Jan 2022    Prostate biopsy    COLONOSCOPY      TONSILLECTOMY & ADENOIDECTOMY      RUST APPENDECTOMY      Description: Appendectomy;  Recorded: 04/25/2013;     Immunizations:   Immunization History   Administered Date(s) Administered    COVID-19 12+ (Pfizer) 11/12/2023    COVID-19 Bivalent 12+ (Pfizer) 11/04/2022    COVID-19 MONOVALENT 12+ (Pfizer) 01/26/2021, 02/16/2021, 10/02/2021    Influenza (H1N1) 01/20/2010     "Influenza (High Dose) Trivalent,PF (Fluzone) 10/28/2019    Influenza Vaccine 65+ (Fluzone HD) 12/27/2021, 11/04/2022    Influenza Vaccine >6 months,quad, PF 09/14/2015    Pneumo Conj 13-V (2010&after) 10/28/2019    Pneumococcal 23 valent 09/14/2015    Pneumococcal, Unspecified 09/14/2016    TDAP (Adacel,Boostrix) 05/25/2010, 09/14/2015       ROS A comprehensive review of systems was performed and was otherwise negative    Medications, allergies, and problem list were reviewed and updated    Exam  /72 (BP Location: Right arm, Patient Position: Sitting)   Pulse 56   Temp 97.6  F (36.4  C)   Resp 18   Ht 1.811 m (5' 11.3\")   Wt 81.2 kg (179 lb)   SpO2 97%   BMI 24.76 kg/m    He appears well.  Heart is regular without murmur.  No edema.    Assessment/Plan  1. Ectopic atrial tachycardia (H) (Primary)  I reviewed the Zio patch results with patient and gave him a copy.  Ectopic atrial tachycardia would be consistent with stress event with high caffeine, now no recurrence.    I would still have him proceed with the heart echo to document if it is normal.  If he has recurrent episodes symptoms are on his Apple Watch he could redo the Zio patch.    Heart rate low at baseline and I would not plan a beta-blocker for him at this time.    2. Benign prostatic hyperplasia with urinary retention  Straight caths regularly 4 times a day and follow-up with urology.    He will get his COVID and flu shot soon with his wife.    Patient has now establish care with me and will see me in the spring for physical exam.    The longitudinal plan of care for the diagnosis(es)/condition(s) as documented were addressed during this visit. Due to the added complexity in care, I will continue to support Mono in the subsequent management and with ongoing continuity of care.        Return in 4 months (on 5/2/2025) for Adult wellness visit.   Patient Instructions   Your rapid heart rate spells will likely supraventricular tachycardia, " "more specifically nonsustained ectopic atrial tachycardia.  You do not need to be on a blood thinner for this type of tachycardia.  This tachycardia can often be precipitated by stress, lack of sleep, caffeine or other illness.    Avoiding caffeine, getting regular exercise and avoiding stresses or treatment plan.    If you continue to get recurrent rapid heart rate spells on your Apple Watch or increasing palpitations, we can redo the Zio patch.    Proceed with your heart echo to make sure that is normal.    Get your COVID and flu shot as planned.    See me on May 2 for adult wellness visit physical exam, come fasting.    Jamaal Evangelista MD, MD        Current Outpatient Medications   Medication Sig Dispense Refill    MULTIPLE VITAMIN PO Take by mouth daily Mostly in the winter       No Known Allergies  Social History     Tobacco Use    Smoking status: Never     Passive exposure: Never    Smokeless tobacco: Never   Vaping Use    Vaping status: Never Used   Substance Use Topics    Alcohol use: Yes     Alcohol/week: 6.0 - 7.0 standard drinks of alcohol     Comment: Small    Drug use: No             Subjective   Mono is a 74 year old, presenting for the following health issues:  Follow Up (Pt is fasting)      12/17/2024     8:45 AM   Additional Questions   Roomed by Radha CALLEJAS     History of Present Illness       He eats 2-3 servings of fruits and vegetables daily.He consumes 0 sweetened beverage(s) daily.He exercises with enough effort to increase his heart rate 10 to 19 minutes per day.  He exercises with enough effort to increase his heart rate 6 days per week. He is missing 2 dose(s) of medications per week.  He is not taking prescribed medications regularly due to remembering to take.                     Objective    /72 (BP Location: Right arm, Patient Position: Sitting)   Pulse 56   Temp 97.6  F (36.4  C)   Resp 18   Ht 1.811 m (5' 11.3\")   Wt 81.2 kg (179 lb)   SpO2 97%   BMI 24.76 kg/m    Body mass " index is 24.76 kg/m .  Physical Exam               Signed Electronically by: Jamaal Evangelista MD

## 2024-12-19 ENCOUNTER — HOSPITAL ENCOUNTER (OUTPATIENT)
Dept: CARDIOLOGY | Facility: CLINIC | Age: 74
Discharge: HOME | End: 2024-12-19
Attending: INTERNAL MEDICINE
Payer: MEDICARE

## 2024-12-19 DIAGNOSIS — I47.10 PAROXYSMAL SUPRAVENTRICULAR TACHYCARDIA (H): ICD-10-CM

## 2024-12-19 LAB — LVEF ECHO: NORMAL

## 2024-12-19 PROCEDURE — 93306 TTE W/DOPPLER COMPLETE: CPT

## 2025-05-02 PROBLEM — C43.9 MALIGNANT MELANOMA (H): Status: ACTIVE | Noted: 2024-11-13

## 2025-05-02 PROBLEM — N18.30 CKD (CHRONIC KIDNEY DISEASE) STAGE 3, GFR 30-59 ML/MIN (H): Status: RESOLVED | Noted: 2021-10-25 | Resolved: 2025-05-02

## 2025-05-10 ENCOUNTER — HOSPITAL ENCOUNTER (OUTPATIENT)
Dept: CT IMAGING | Facility: CLINIC | Age: 75
Discharge: HOME | End: 2025-05-10
Attending: INTERNAL MEDICINE | Admitting: INTERNAL MEDICINE
Payer: MEDICARE

## 2025-05-10 DIAGNOSIS — E78.00 HYPERCHOLESTEROLEMIA: ICD-10-CM

## 2025-05-10 PROCEDURE — 75571 CT HRT W/O DYE W/CA TEST: CPT

## 2025-05-12 ENCOUNTER — RESULTS FOLLOW-UP (OUTPATIENT)
Dept: INTERNAL MEDICINE | Facility: CLINIC | Age: 75
End: 2025-05-12

## 2025-05-12 LAB
CV CALCIUM SCORE AGATSTON LM: 3
CV CALCIUM SCORING AGATSON LAD: 0
CV CALCIUM SCORING AGATSTON CX: 0
CV CALCIUM SCORING AGATSTON RCA: 20
CV CALCIUM SCORING AGATSTON TOTAL: 23

## 2025-05-12 PROCEDURE — 75571 CT HRT W/O DYE W/CA TEST: CPT | Mod: 26 | Performed by: INTERNAL MEDICINE

## 2025-07-21 ENCOUNTER — APPOINTMENT (OUTPATIENT)
Dept: URBAN - METROPOLITAN AREA CLINIC 260 | Age: 75
Setting detail: DERMATOLOGY
End: 2025-07-21

## 2025-07-21 DIAGNOSIS — L57.0 ACTINIC KERATOSIS: ICD-10-CM

## 2025-07-21 DIAGNOSIS — D49.2 NEOPLASM OF UNSPECIFIED BEHAVIOR OF BONE, SOFT TISSUE, AND SKIN: ICD-10-CM

## 2025-07-21 DIAGNOSIS — D18.0 HEMANGIOMA: ICD-10-CM

## 2025-07-21 DIAGNOSIS — L82.1 OTHER SEBORRHEIC KERATOSIS: ICD-10-CM

## 2025-07-21 DIAGNOSIS — Z71.89 OTHER SPECIFIED COUNSELING: ICD-10-CM

## 2025-07-21 DIAGNOSIS — D22 MELANOCYTIC NEVI: ICD-10-CM

## 2025-07-21 DIAGNOSIS — L81.4 OTHER MELANIN HYPERPIGMENTATION: ICD-10-CM

## 2025-07-21 PROBLEM — D22.5 MELANOCYTIC NEVI OF TRUNK: Status: ACTIVE | Noted: 2025-07-21

## 2025-07-21 PROBLEM — D18.01 HEMANGIOMA OF SKIN AND SUBCUTANEOUS TISSUE: Status: ACTIVE | Noted: 2025-07-21

## 2025-07-21 PROCEDURE — 11102 TANGNTL BX SKIN SINGLE LES: CPT

## 2025-07-21 PROCEDURE — OTHER COUNSELING: OTHER

## 2025-07-21 PROCEDURE — OTHER LIQUID NITROGEN: OTHER

## 2025-07-21 PROCEDURE — OTHER MIPS QUALITY: OTHER

## 2025-07-21 PROCEDURE — 17000 DESTRUCT PREMALG LESION: CPT | Mod: 59

## 2025-07-21 PROCEDURE — 11103 TANGNTL BX SKIN EA SEP/ADDL: CPT

## 2025-07-21 PROCEDURE — 99213 OFFICE O/P EST LOW 20 MIN: CPT | Mod: 25

## 2025-07-21 PROCEDURE — OTHER BIOPSY BY SHAVE METHOD: OTHER

## 2025-07-21 ASSESSMENT — LOCATION SIMPLE DESCRIPTION DERM
LOCATION SIMPLE: RIGHT CHEEK
LOCATION SIMPLE: UPPER BACK
LOCATION SIMPLE: LOWER BACK
LOCATION SIMPLE: RIGHT THIGH

## 2025-07-21 ASSESSMENT — LOCATION DETAILED DESCRIPTION DERM
LOCATION DETAILED: INFERIOR THORACIC SPINE
LOCATION DETAILED: RIGHT INFERIOR CENTRAL MALAR CHEEK
LOCATION DETAILED: SUPERIOR LUMBAR SPINE
LOCATION DETAILED: RIGHT ANTERIOR LATERAL PROXIMAL THIGH
LOCATION DETAILED: RIGHT ANTERIOR LATERAL DISTAL THIGH

## 2025-07-21 ASSESSMENT — LOCATION ZONE DERM
LOCATION ZONE: TRUNK
LOCATION ZONE: FACE
LOCATION ZONE: LEG